# Patient Record
Sex: FEMALE | Race: BLACK OR AFRICAN AMERICAN | Employment: PART TIME | URBAN - METROPOLITAN AREA
[De-identification: names, ages, dates, MRNs, and addresses within clinical notes are randomized per-mention and may not be internally consistent; named-entity substitution may affect disease eponyms.]

---

## 2018-04-13 ENCOUNTER — OFFICE VISIT (OUTPATIENT)
Dept: ORTHOPEDIC SURGERY | Age: 43
End: 2018-04-13
Payer: COMMERCIAL

## 2018-04-13 VITALS
BODY MASS INDEX: 37.22 KG/M2 | HEART RATE: 78 BPM | DIASTOLIC BLOOD PRESSURE: 83 MMHG | HEIGHT: 64 IN | TEMPERATURE: 97 F | RESPIRATION RATE: 18 BRPM | SYSTOLIC BLOOD PRESSURE: 142 MMHG | WEIGHT: 218 LBS

## 2018-04-13 DIAGNOSIS — M75.81 TENDINITIS OF RIGHT ROTATOR CUFF: ICD-10-CM

## 2018-04-13 DIAGNOSIS — M75.82 TENDINITIS OF LEFT ROTATOR CUFF: Primary | ICD-10-CM

## 2018-04-13 PROCEDURE — 1036F TOBACCO NON-USER: CPT | Performed by: ORTHOPAEDIC SURGERY

## 2018-04-13 PROCEDURE — 99203 OFFICE O/P NEW LOW 30 MIN: CPT | Performed by: ORTHOPAEDIC SURGERY

## 2018-04-13 PROCEDURE — G8427 DOCREV CUR MEDS BY ELIG CLIN: HCPCS | Performed by: ORTHOPAEDIC SURGERY

## 2018-04-13 PROCEDURE — 99203 OFFICE O/P NEW LOW 30 MIN: CPT

## 2018-04-13 PROCEDURE — G8417 CALC BMI ABV UP PARAM F/U: HCPCS | Performed by: ORTHOPAEDIC SURGERY

## 2018-04-13 RX ORDER — MELOXICAM 7.5 MG/1
7.5 TABLET ORAL DAILY
Qty: 30 TABLET | Refills: 3 | Status: SHIPPED | OUTPATIENT
Start: 2018-04-13 | End: 2019-04-03

## 2019-04-03 PROBLEM — J45.40 MODERATE PERSISTENT ASTHMA WITHOUT COMPLICATION: Status: ACTIVE | Noted: 2019-04-03

## 2019-06-10 PROBLEM — J00 ACUTE RHINITIS: Status: ACTIVE | Noted: 2019-06-10

## 2020-03-11 PROBLEM — Z00.00 WELLNESS EXAMINATION: Status: ACTIVE | Noted: 2020-03-11

## 2020-03-27 PROBLEM — J45.21 MILD INTERMITTENT ASTHMA WITH EXACERBATION: Status: ACTIVE | Noted: 2020-03-27

## 2020-04-08 ENCOUNTER — HOSPITAL ENCOUNTER (OUTPATIENT)
Dept: GENERAL RADIOLOGY | Age: 45
Discharge: HOME | End: 2020-04-10
Payer: COMMERCIAL

## 2020-04-08 ENCOUNTER — HOSPITAL ENCOUNTER (OUTPATIENT)
Age: 45
Discharge: HOME | End: 2020-04-10
Payer: COMMERCIAL

## 2020-04-08 PROCEDURE — 71046 X-RAY EXAM CHEST 2 VIEWS: CPT

## 2020-04-10 PROBLEM — Z00.00 WELLNESS EXAMINATION: Status: RESOLVED | Noted: 2020-03-11 | Resolved: 2020-04-10

## 2020-11-02 ENCOUNTER — OFFICE VISIT (OUTPATIENT)
Dept: PRIMARY CARE CLINIC | Age: 45
End: 2020-11-02
Payer: COMMERCIAL

## 2020-11-02 PROCEDURE — 99204 OFFICE O/P NEW MOD 45 MIN: CPT | Performed by: FAMILY MEDICINE

## 2020-11-02 NOTE — PROGRESS NOTES
History   Problem Relation Age of Onset    Other Mother         uterine fibroids    Hypertension Father     High Cholesterol Father       Past Surgical History:   Procedure Laterality Date     SECTION      OTHER SURGICAL HISTORY      cervical conization    TUBAL LIGATION      UPPER GASTROINTESTINAL ENDOSCOPY      due to gastritis      Vitals:    20 1150   BP: (!) 145/85   Temp: 98.8 °F (37.1 °C)   Weight: 233 lb (105.7 kg)   Height: 5' 4\" (1.626 m)       Objective:    Physical Exam    Raymundo King was seen today for establish care. Diagnoses and all orders for this visit:    Essential hypertension    Moderate persistent asthma without complication    Acute rhinitis    Throat irritation    Gastroesophageal reflux disease without esophagitis        Comments: Have her check blood pressure few other places before I see her back in 2 weeks. Laboratory studies in a few days. We will do an EKG next visit. May need referral to allergist or ear nose and throat doctor. May need a GI evaluation. May need addition of Singulair or an antihistamine to her program.  Discussed diet and weight. Great toe time was spent reviewing records establishing treatment protocol treatment plan majority of the time spent on face-to-face exam and education. A great deal of time spent reviewing medications, diet, exercise, social issues. Also reviewing the chart before entering the room with patient and finishing charting after leaving patient's room. More than half of that time was spent face to face with the patient in counseling and coordinating care. Follow Up: Return in about 2 weeks (around 2020) for With EKG, Lab Before.      Seen by:  Vijaya Benites DO

## 2020-11-03 VITALS
WEIGHT: 233 LBS | DIASTOLIC BLOOD PRESSURE: 85 MMHG | TEMPERATURE: 98.8 F | HEIGHT: 64 IN | BODY MASS INDEX: 39.78 KG/M2 | SYSTOLIC BLOOD PRESSURE: 145 MMHG

## 2020-11-03 PROBLEM — J39.2 THROAT IRRITATION: Chronic | Status: ACTIVE | Noted: 2020-11-03

## 2020-11-03 PROBLEM — J45.40 MODERATE PERSISTENT ASTHMA WITHOUT COMPLICATION: Chronic | Status: ACTIVE | Noted: 2019-04-03

## 2020-11-03 PROBLEM — K21.9 GASTROESOPHAGEAL REFLUX DISEASE WITHOUT ESOPHAGITIS: Chronic | Status: ACTIVE | Noted: 2020-11-03

## 2020-11-03 PROBLEM — I10 ESSENTIAL HYPERTENSION: Status: ACTIVE | Noted: 2020-11-03

## 2020-11-03 PROBLEM — J00 ACUTE RHINITIS: Chronic | Status: ACTIVE | Noted: 2019-06-10

## 2020-11-03 PROBLEM — J39.2 THROAT IRRITATION: Status: ACTIVE | Noted: 2020-11-03

## 2020-11-03 PROBLEM — I10 ESSENTIAL HYPERTENSION: Chronic | Status: ACTIVE | Noted: 2020-11-03

## 2020-11-03 PROBLEM — K21.9 GASTROESOPHAGEAL REFLUX DISEASE WITHOUT ESOPHAGITIS: Status: ACTIVE | Noted: 2020-11-03

## 2020-11-03 ASSESSMENT — ENCOUNTER SYMPTOMS
ALLERGIC/IMMUNOLOGIC NEGATIVE: 1
EYES NEGATIVE: 1
GASTROINTESTINAL NEGATIVE: 1
RESPIRATORY NEGATIVE: 1

## 2020-11-03 ASSESSMENT — PATIENT HEALTH QUESTIONNAIRE - PHQ9
SUM OF ALL RESPONSES TO PHQ QUESTIONS 1-9: 0
SUM OF ALL RESPONSES TO PHQ9 QUESTIONS 1 & 2: 0
1. LITTLE INTEREST OR PLEASURE IN DOING THINGS: 0
2. FEELING DOWN, DEPRESSED OR HOPELESS: 0
SUM OF ALL RESPONSES TO PHQ QUESTIONS 1-9: 0
SUM OF ALL RESPONSES TO PHQ QUESTIONS 1-9: 0

## 2020-11-05 DIAGNOSIS — I10 ESSENTIAL HYPERTENSION: ICD-10-CM

## 2020-11-05 DIAGNOSIS — E03.9 ACQUIRED HYPOTHYROIDISM: ICD-10-CM

## 2020-11-05 DIAGNOSIS — E55.9 VITAMIN D DEFICIENCY: ICD-10-CM

## 2020-11-05 DIAGNOSIS — Z00.01 ENCOUNTER FOR ANNUAL GENERAL MEDICAL EXAMINATION WITH ABNORMAL FINDINGS IN ADULT: ICD-10-CM

## 2020-11-05 DIAGNOSIS — R73.03 PRE-DIABETES: ICD-10-CM

## 2020-11-05 LAB
ALBUMIN SERPL-MCNC: 4.3 G/DL (ref 3.5–5.2)
ALP BLD-CCNC: 85 U/L (ref 35–104)
ALT SERPL-CCNC: 15 U/L (ref 0–32)
ANION GAP SERPL CALCULATED.3IONS-SCNC: 12 MMOL/L (ref 7–16)
AST SERPL-CCNC: 33 U/L (ref 0–31)
BACTERIA: ABNORMAL /HPF
BASOPHILS ABSOLUTE: 0.05 E9/L (ref 0–0.2)
BASOPHILS RELATIVE PERCENT: 0.8 % (ref 0–2)
BILIRUB SERPL-MCNC: 0.6 MG/DL (ref 0–1.2)
BILIRUBIN URINE: NEGATIVE
BLOOD, URINE: NORMAL
BUN BLDV-MCNC: 9 MG/DL (ref 6–20)
CALCIUM SERPL-MCNC: 9.3 MG/DL (ref 8.6–10.2)
CHLORIDE BLD-SCNC: 104 MMOL/L (ref 98–107)
CHOLESTEROL, TOTAL: 179 MG/DL (ref 0–199)
CLARITY: CLEAR
CO2: 24 MMOL/L (ref 22–29)
COLOR: YELLOW
CREAT SERPL-MCNC: 0.6 MG/DL (ref 0.5–1)
EOSINOPHILS ABSOLUTE: 0.16 E9/L (ref 0.05–0.5)
EOSINOPHILS RELATIVE PERCENT: 2.4 % (ref 0–6)
EPITHELIAL CELLS, UA: ABNORMAL /HPF
GFR AFRICAN AMERICAN: >60
GFR NON-AFRICAN AMERICAN: >60 ML/MIN/1.73
GLUCOSE BLD-MCNC: 89 MG/DL (ref 74–99)
GLUCOSE URINE: NEGATIVE MG/DL
HBA1C MFR BLD: 5.1 % (ref 4–5.6)
HCT VFR BLD CALC: 40.2 % (ref 34–48)
HDLC SERPL-MCNC: 67 MG/DL
HEMOGLOBIN: 13.4 G/DL (ref 11.5–15.5)
IMMATURE GRANULOCYTES #: 0.01 E9/L
IMMATURE GRANULOCYTES %: 0.2 % (ref 0–5)
KETONES, URINE: NEGATIVE MG/DL
LDL CHOLESTEROL CALCULATED: 97 MG/DL (ref 0–99)
LEUKOCYTE ESTERASE, URINE: NEGATIVE
LYMPHOCYTES ABSOLUTE: 2.39 E9/L (ref 1.5–4)
LYMPHOCYTES RELATIVE PERCENT: 35.9 % (ref 20–42)
MCH RBC QN AUTO: 29.8 PG (ref 26–35)
MCHC RBC AUTO-ENTMCNC: 33.3 % (ref 32–34.5)
MCV RBC AUTO: 89.3 FL (ref 80–99.9)
MONOCYTES ABSOLUTE: 0.43 E9/L (ref 0.1–0.95)
MONOCYTES RELATIVE PERCENT: 6.5 % (ref 2–12)
NEUTROPHILS ABSOLUTE: 3.62 E9/L (ref 1.8–7.3)
NEUTROPHILS RELATIVE PERCENT: 54.2 % (ref 43–80)
NITRITE, URINE: NEGATIVE
PDW BLD-RTO: 11.8 FL (ref 11.5–15)
PH UA: 5.5 (ref 5–9)
PLATELET # BLD: 281 E9/L (ref 130–450)
PMV BLD AUTO: 10.6 FL (ref 7–12)
POTASSIUM SERPL-SCNC: 3.9 MMOL/L (ref 3.5–5)
PROTEIN UA: NEGATIVE MG/DL
RBC # BLD: 4.5 E12/L (ref 3.5–5.5)
RBC UA: ABNORMAL /HPF (ref 0–2)
SODIUM BLD-SCNC: 140 MMOL/L (ref 132–146)
SPECIFIC GRAVITY UA: 1.02 (ref 1–1.03)
T4 TOTAL: 7.3 MCG/DL (ref 4.5–11.7)
TOTAL PROTEIN: 7.5 G/DL (ref 6.4–8.3)
TRIGL SERPL-MCNC: 73 MG/DL (ref 0–149)
TSH SERPL DL<=0.05 MIU/L-ACNC: 0.64 UIU/ML (ref 0.27–4.2)
UROBILINOGEN, URINE: 0.2 E.U./DL
VITAMIN D 25-HYDROXY: 50 NG/ML (ref 30–100)
VLDLC SERPL CALC-MCNC: 15 MG/DL
WBC # BLD: 6.7 E9/L (ref 4.5–11.5)
WBC UA: ABNORMAL /HPF (ref 0–5)

## 2020-11-19 ENCOUNTER — OFFICE VISIT (OUTPATIENT)
Dept: PRIMARY CARE CLINIC | Age: 45
End: 2020-11-19
Payer: COMMERCIAL

## 2020-11-19 VITALS
BODY MASS INDEX: 40.46 KG/M2 | WEIGHT: 237 LBS | DIASTOLIC BLOOD PRESSURE: 82 MMHG | SYSTOLIC BLOOD PRESSURE: 132 MMHG | HEIGHT: 64 IN | TEMPERATURE: 98.3 F

## 2020-11-19 PROCEDURE — 93000 ELECTROCARDIOGRAM COMPLETE: CPT | Performed by: FAMILY MEDICINE

## 2020-11-19 PROCEDURE — 99396 PREV VISIT EST AGE 40-64: CPT | Performed by: FAMILY MEDICINE

## 2020-11-19 ASSESSMENT — ENCOUNTER SYMPTOMS
EYES NEGATIVE: 1
ALLERGIC/IMMUNOLOGIC NEGATIVE: 1
RESPIRATORY NEGATIVE: 1
GASTROINTESTINAL NEGATIVE: 1

## 2020-11-19 NOTE — PROGRESS NOTES
20  Name: Dayanara Bruce    : 1975    Sex: female    Age: 39 y.o. Subjective:  Chief Complaint: Patient is here for checkup regarding labs, asthma. Blood pressure    Feels well. No chest pain or shortness of breath. She has been to several doctors regarding mucus feeling in her throat. She does have some GERD symptoms. Review of Systems   Constitutional: Negative. HENT: Negative. Eyes: Negative. Respiratory: Negative. Cardiovascular: Negative. Gastrointestinal: Negative. Endocrine: Negative. Genitourinary: Negative. Musculoskeletal: Negative. Skin: Negative. Allergic/Immunologic: Negative. Neurological: Negative. Hematological: Negative. Psychiatric/Behavioral: Negative.           Current Outpatient Medications:     fluticasone-salmeterol (ADVAIR HFA) 115-21 MCG/ACT inhaler, Inhale 2 puffs into the lungs 2 times daily, Disp: 1 Inhaler, Rfl: 3    albuterol sulfate  (90 Base) MCG/ACT inhaler, Inhale 2 puffs into the lungs every 6 hours as needed for Wheezing, Disp: 1 Inhaler, Rfl: 5  No Known Allergies  Social History     Socioeconomic History    Marital status: Single     Spouse name: Not on file    Number of children: Not on file    Years of education: Not on file    Highest education level: Not on file   Occupational History    Not on file   Social Needs    Financial resource strain: Not on file    Food insecurity     Worry: Not on file     Inability: Not on file    Transportation needs     Medical: Not on file     Non-medical: Not on file   Tobacco Use    Smoking status: Former Smoker     Packs/day: 0.30     Years: 7.00     Pack years: 2.10     Types: Cigarettes     Start date:      Last attempt to quit:      Years since quittin.8    Smokeless tobacco: Never Used   Substance and Sexual Activity    Alcohol use: Yes     Comment: social    Drug use: No    Sexual activity: Not Currently     Partners: Male Comment: tubal ligation 2008   Lifestyle    Physical activity     Days per week: Not on file     Minutes per session: Not on file    Stress: Not on file   Relationships    Social connections     Talks on phone: Not on file     Gets together: Not on file     Attends Druze service: Not on file     Active member of club or organization: Not on file     Attends meetings of clubs or organizations: Not on file     Relationship status: Not on file    Intimate partner violence     Fear of current or ex partner: Not on file     Emotionally abused: Not on file     Physically abused: Not on file     Forced sexual activity: Not on file   Other Topics Concern    Not on file   Social History Narrative    New patient  with complaints of mucus in her throat    Single with 3 children. 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 Smoker quitting age 22    Seasonal allergies    Asthma    GERD    Elevated blood pressure      Referred to  ent  for throat irritation      Past Medical History:   Diagnosis Date    Abnormal Pap smear of cervix     Allergic rhinitis     seasonal    Asthma     Gastritis      Family History   Problem Relation Age of Onset    Other Mother         uterine fibroids    Hypertension Father     High Cholesterol Father       Past Surgical History:   Procedure Laterality Date     SECTION      OTHER SURGICAL HISTORY      cervical conization    TUBAL LIGATION      UPPER GASTROINTESTINAL ENDOSCOPY      due to gastritis      Vitals:    20 0949   BP: 132/82   Temp: 98.3 °F (36.8 °C)   Weight: 237 lb (107.5 kg)   Height: 5' 4\" (1.626 m)       Objective:    Physical Exam  Vitals signs reviewed. Constitutional:       Appearance: She is well-developed. HENT:      Head: Normocephalic. Eyes:      Pupils: Pupils are equal, round, and reactive to light. Neck:      Musculoskeletal: Normal range of motion. Cardiovascular:      Rate and Rhythm: Normal rate and regular rhythm. Pulmonary:      Effort: Pulmonary effort is normal.      Breath sounds: Normal breath sounds. Abdominal:      Palpations: Abdomen is soft. Musculoskeletal: Normal range of motion. Skin:     General: Skin is warm. Neurological:      Mental Status: She is alert and oriented to person, place, and time. Psychiatric:         Behavior: Behavior normal.         Paula Alan was seen today for discuss labs. Diagnoses and all orders for this visit:    Encounter for annual general medical examination with abnormal findings in adult    Essential hypertension  -     EKG 12 lead; Future  -     EKG 12 lead    Moderate persistent asthma without complication    Acute rhinitis    Throat irritation  -     Halima - Tanja Means Sic., DO, Ear, Nose, Throat, Bee    Gastroesophageal reflux disease without esophagitis        Comments: Low-fat low sugar diet. Exercise. Low-carb diet. Appointment ear nose and throat doctor. Warned of adverse effects of Advair. Check blood pressure periodically at home. A great deal of time spent reviewing medications, diet, exercise, social issues. Also reviewing the chart before entering the room with patient and finishing charting after leaving patient's room. More than half of that time was spent face to face with the patient in counseling and coordinating care.       Follow Up: Return for See Referral.     Seen by:  Feliciano Cranker, DO

## 2021-01-21 ENCOUNTER — OFFICE VISIT (OUTPATIENT)
Dept: ENT CLINIC | Age: 46
End: 2021-01-21
Payer: COMMERCIAL

## 2021-01-21 VITALS — BODY MASS INDEX: 40.46 KG/M2 | WEIGHT: 237 LBS | TEMPERATURE: 98 F | HEIGHT: 64 IN

## 2021-01-21 DIAGNOSIS — J34.2 DNS (DEVIATED NASAL SEPTUM): ICD-10-CM

## 2021-01-21 DIAGNOSIS — J30.9 ALLERGIC RHINITIS, UNSPECIFIED SEASONALITY, UNSPECIFIED TRIGGER: Primary | ICD-10-CM

## 2021-01-21 PROCEDURE — 99203 OFFICE O/P NEW LOW 30 MIN: CPT | Performed by: NURSE PRACTITIONER

## 2021-01-21 RX ORDER — FLUTICASONE PROPIONATE 50 MCG
2 SPRAY, SUSPENSION (ML) NASAL DAILY
Qty: 2 BOTTLE | Refills: 1 | Status: SHIPPED
Start: 2021-01-21 | End: 2021-06-14

## 2021-01-21 RX ORDER — AZELASTINE 1 MG/ML
2 SPRAY, METERED NASAL 2 TIMES DAILY
Qty: 1 BOTTLE | Refills: 1 | Status: SHIPPED
Start: 2021-01-21 | Stop reason: SDUPTHER

## 2021-01-21 ASSESSMENT — ENCOUNTER SYMPTOMS
SORE THROAT: 1
EYES NEGATIVE: 1
SINUS PAIN: 0
RESPIRATORY NEGATIVE: 1
SHORTNESS OF BREATH: 0
RHINORRHEA: 1
SINUS PRESSURE: 0
STRIDOR: 0

## 2021-01-21 NOTE — PROGRESS NOTES
[facility] Otolaryngology  Dr. Alber Cross. MARY Menchaca. Ms.Ed. New Consult       Patient Name:  Shelbie Alexander  :  1975     CHIEF C/O:    Chief Complaint   Patient presents with    Consultation     NP Sinus patient states sinus drainage, headaches. HISTORY OBTAINED FROM:  patient    HISTORY OF PRESENT ILLNESS:       Jameel Davison is a 39y.o. year old female, here today for sinus congestion, drainage and headaches. She states her symptoms have been persistent for the past 2 to 3 years and have worsened over the last 8 months. She states that she has near constant congestion, rhinorrhea, and postnasal drainage. She often has a sore throat that is worse in the morning. She does take Zyrtec daily for her allergies but no other medications for allergy symptoms. He denies any fevers. She states her drainage from her nose is clear without blood or purulence. She does complain of intermittent left ear pressure with mild muffling of her hearing. She denies any tinnitus or vertigo. She denies any previous sinus surgery. She does state having previous allergy testing with several environmental allergies. Past Medical History:   Diagnosis Date    Abnormal Pap smear of cervix     Allergic rhinitis     seasonal    Asthma     Gastritis      Past Surgical History:   Procedure Laterality Date     SECTION      OTHER SURGICAL HISTORY      cervical conization    TUBAL LIGATION      UPPER GASTROINTESTINAL ENDOSCOPY      due to gastritis       Current Outpatient Medications:     MULTIPLE VITAMIN PO, Take by mouth, Disp: , Rfl:     Probiotic Product (PROBIOTIC-10 PO), Take by mouth, Disp: , Rfl:     fluticasone-salmeterol (ADVAIR HFA) 115-21 MCG/ACT inhaler, Inhale 2 puffs into the lungs 2 times daily, Disp: 1 Inhaler, Rfl: 3    albuterol sulfate  (90 Base) MCG/ACT inhaler, Inhale 2 puffs into the lungs every 6 hours as needed for Wheezing, Disp: 1 Inhaler, Rfl: 5  Patient has no known allergies. Social History     Tobacco Use    Smoking status: Former Smoker     Packs/day: 0.30     Years: 7.00     Pack years: 2.10     Types: Cigarettes     Start date:      Quit date:      Years since quittin.0    Smokeless tobacco: Never Used   Substance Use Topics    Alcohol use: Yes     Comment: social    Drug use: No     Family History   Problem Relation Age of Onset    Other Mother         uterine fibroids    Hypertension Father     High Cholesterol Father        Review of Systems   Constitutional: Negative. Negative for activity change and appetite change. HENT: Positive for congestion, postnasal drip, rhinorrhea and sore throat. Negative for sinus pressure and sinus pain. Eyes: Negative. Respiratory: Negative. Negative for shortness of breath and stridor. Cardiovascular: Negative. Negative for chest pain and palpitations. Endocrine: Negative. Genitourinary: Negative. Musculoskeletal: Negative. Skin: Negative. Neurological: Negative. Negative for dizziness. Hematological: Negative. Psychiatric/Behavioral: Negative. Temp 98 °F (36.7 °C)   Ht 5' 4\" (1.626 m)   Wt 237 lb (107.5 kg)   BMI 40.68 kg/m²   Physical Exam  Constitutional:       Appearance: Normal appearance. HENT:      Head: Normocephalic. Right Ear: Tympanic membrane, ear canal and external ear normal.      Left Ear: Tympanic membrane, ear canal and external ear normal.      Nose: Septal deviation present. Right Turbinates: Swollen and pale. Left Turbinates: Swollen and pale. Mouth/Throat:      Lips: Pink. Eyes:      Conjunctiva/sclera: Conjunctivae normal.      Pupils: Pupils are equal, round, and reactive to light. Neck:      Musculoskeletal: Normal range of motion. No neck rigidity or muscular tenderness. Cardiovascular:      Rate and Rhythm: Normal rate and regular rhythm. Pulses: Normal pulses.    Pulmonary:

## 2021-02-04 ENCOUNTER — OFFICE VISIT (OUTPATIENT)
Dept: PRIMARY CARE CLINIC | Age: 46
End: 2021-02-04
Payer: COMMERCIAL

## 2021-02-04 VITALS — WEIGHT: 232 LBS | BODY MASS INDEX: 39.61 KG/M2 | TEMPERATURE: 98.8 F | HEIGHT: 64 IN

## 2021-02-04 DIAGNOSIS — M54.32 SCIATICA OF LEFT SIDE: Primary | ICD-10-CM

## 2021-02-04 PROCEDURE — 96372 THER/PROPH/DIAG INJ SC/IM: CPT | Performed by: FAMILY MEDICINE

## 2021-02-04 PROCEDURE — 99213 OFFICE O/P EST LOW 20 MIN: CPT | Performed by: FAMILY MEDICINE

## 2021-02-04 RX ORDER — PREDNISONE 10 MG/1
TABLET ORAL
Qty: 18 TABLET | Refills: 0 | Status: SHIPPED
Start: 2021-02-04 | End: 2021-03-05 | Stop reason: ALTCHOICE

## 2021-02-04 RX ORDER — BACLOFEN 10 MG/1
10 TABLET ORAL NIGHTLY
Qty: 14 TABLET | Refills: 1 | Status: SHIPPED
Start: 2021-02-04 | End: 2021-03-05

## 2021-02-04 RX ORDER — METHYLPREDNISOLONE ACETATE 40 MG/ML
40 INJECTION, SUSPENSION INTRA-ARTICULAR; INTRALESIONAL; INTRAMUSCULAR; SOFT TISSUE ONCE
Status: COMPLETED | OUTPATIENT
Start: 2021-02-04 | End: 2021-02-04

## 2021-02-04 RX ADMIN — METHYLPREDNISOLONE ACETATE 40 MG: 40 INJECTION, SUSPENSION INTRA-ARTICULAR; INTRALESIONAL; INTRAMUSCULAR; SOFT TISSUE at 11:02

## 2021-02-04 ASSESSMENT — ENCOUNTER SYMPTOMS
GASTROINTESTINAL NEGATIVE: 1
BACK PAIN: 1
RESPIRATORY NEGATIVE: 1

## 2021-02-04 NOTE — PROGRESS NOTES
21  Name: Karina Garcia    : 1975    Sex: female    Age: 39 y.o. Subjective:  Chief Complaint: Patient is here for left low back pain with rad into left leg to knee     One week  no trauma  bm and urine  Ok       No    Sim inpast      Review of Systems   Respiratory: Negative. Cardiovascular: Negative. Gastrointestinal: Negative. Musculoskeletal: Positive for back pain.         See  hpi         Current Outpatient Medications:     baclofen (LIORESAL) 10 MG tablet, Take 1 tablet by mouth nightly, Disp: 14 tablet, Rfl: 1    predniSONE (DELTASONE) 10 MG tablet, ONE TID FOR THREE DAYS, ONE BID FOR THREE DAYS, ONE QD FOR THREE DAYS   FOOD, Disp: 18 tablet, Rfl: 0    MULTIPLE VITAMIN PO, Take by mouth, Disp: , Rfl:     Probiotic Product (PROBIOTIC-10 PO), Take by mouth, Disp: , Rfl:     fluticasone (FLONASE) 50 MCG/ACT nasal spray, 2 sprays by Each Nostril route daily, Disp: 2 Bottle, Rfl: 1    azelastine (ASTELIN) 0.1 % nasal spray, 2 sprays by Nasal route 2 times daily Use in each nostril as directed, Disp: 1 Bottle, Rfl: 1    fluticasone-salmeterol (ADVAIR HFA) 115-21 MCG/ACT inhaler, Inhale 2 puffs into the lungs 2 times daily, Disp: 1 Inhaler, Rfl: 12    albuterol sulfate  (90 Base) MCG/ACT inhaler, Inhale 2 puffs into the lungs every 6 hours as needed for Wheezing, Disp: 1 Inhaler, Rfl: 5  No Known Allergies  Social History     Socioeconomic History    Marital status: Single     Spouse name: Not on file    Number of children: Not on file    Years of education: Not on file    Highest education level: Not on file   Occupational History    Not on file   Social Needs    Financial resource strain: Not on file    Food insecurity     Worry: Not on file     Inability: Not on file    Transportation needs     Medical: Not on file     Non-medical: Not on file   Tobacco Use    Smoking status: Former Smoker     Packs/day: 0.30     Years: 7.00     Pack years: 2.10 Height: 5' 4\" (1.626 m)       Objective:    Physical Exam  Neck:      Musculoskeletal: Normal range of motion and neck supple. Cardiovascular:      Rate and Rhythm: Normal rate and regular rhythm. Pulses: Normal pulses. Heart sounds: Normal heart sounds. Musculoskeletal:      Comments: Right si tender with palp with  Para lbumr spasm  fuoll rom left hip      Dec flex  lubamr spine  5-%         Jaleel Every was seen today for other. Diagnoses and all orders for this visit:    Sciatica of left side  -     methylPREDNISolone acetate (DEPO-MEDROL) injection 40 mg  -     baclofen (LIORESAL) 10 MG tablet; Take 1 tablet by mouth nightly  -     predniSONE (DELTASONE) 10 MG tablet; ONE TID FOR THREE DAYS, ONE BID FOR THREE DAYS, ONE QD FOR THREE DAYS   FOOD        Comments: meds  Not  Great by Monday call for   X ray  And  Poss referral A great deal of time spent reviewing medications, diet, exercise, social issues. Also reviewing the chart before entering the room with patient and finishing charting after leaving patient's room. More than half of that time was spent face to face with the patient in counseling and coordinating care. Follow Up: Return if symptoms worsen or fail to improve.      Seen by:  Tad Meigs, DO

## 2021-02-08 ENCOUNTER — TELEPHONE (OUTPATIENT)
Dept: PRIMARY CARE CLINIC | Age: 46
End: 2021-02-08

## 2021-02-08 ENCOUNTER — TELEPHONE (OUTPATIENT)
Dept: ADMINISTRATIVE | Age: 46
End: 2021-02-08

## 2021-02-08 DIAGNOSIS — M54.32 SCIATICA OF LEFT SIDE: Primary | ICD-10-CM

## 2021-02-08 DIAGNOSIS — M51.16 INTERVERTEBRAL DISC DISORDERS WITH RADICULOPATHY, LUMBAR REGION: ICD-10-CM

## 2021-02-08 NOTE — TELEPHONE ENCOUNTER
Pt calling stating she was to update you on her left leg pain and if she was no better you would order an xray. She states pain is no better and goes from her hip down to her lower leg.

## 2021-02-09 ENCOUNTER — HOSPITAL ENCOUNTER (OUTPATIENT)
Age: 46
Discharge: HOME | End: 2021-02-11
Payer: COMMERCIAL

## 2021-02-09 ENCOUNTER — HOSPITAL ENCOUNTER (OUTPATIENT)
Dept: GENERAL RADIOLOGY | Age: 46
Discharge: HOME | End: 2021-02-11
Payer: COMMERCIAL

## 2021-02-09 DIAGNOSIS — M54.32 SCIATICA OF LEFT SIDE: ICD-10-CM

## 2021-02-09 DIAGNOSIS — M51.16 INTERVERTEBRAL DISC DISORDERS WITH RADICULOPATHY, LUMBAR REGION: ICD-10-CM

## 2021-02-09 PROCEDURE — 72170 X-RAY EXAM OF PELVIS: CPT

## 2021-02-09 PROCEDURE — 73502 X-RAY EXAM HIP UNI 2-3 VIEWS: CPT

## 2021-02-09 PROCEDURE — 72100 X-RAY EXAM L-S SPINE 2/3 VWS: CPT

## 2021-02-10 ENCOUNTER — OFFICE VISIT (OUTPATIENT)
Dept: PRIMARY CARE CLINIC | Age: 46
End: 2021-02-10
Payer: COMMERCIAL

## 2021-02-10 ENCOUNTER — TELEPHONE (OUTPATIENT)
Dept: PRIMARY CARE CLINIC | Age: 46
End: 2021-02-10

## 2021-02-10 VITALS — BODY MASS INDEX: 39.61 KG/M2 | WEIGHT: 232 LBS | TEMPERATURE: 98.7 F | HEIGHT: 64 IN

## 2021-02-10 DIAGNOSIS — M54.42 ACUTE LEFT-SIDED LOW BACK PAIN WITH LEFT-SIDED SCIATICA: Primary | ICD-10-CM

## 2021-02-10 DIAGNOSIS — M51.9 INTERVERTEBRAL LUMBAR DISC DISORDER: ICD-10-CM

## 2021-02-10 PROCEDURE — 99213 OFFICE O/P EST LOW 20 MIN: CPT | Performed by: FAMILY MEDICINE

## 2021-02-10 RX ORDER — ETODOLAC 400 MG/1
400 TABLET, FILM COATED ORAL 2 TIMES DAILY
Qty: 20 TABLET | Refills: 0 | Status: SHIPPED
Start: 2021-02-10 | End: 2021-03-05

## 2021-02-10 ASSESSMENT — ENCOUNTER SYMPTOMS
ALLERGIC/IMMUNOLOGIC NEGATIVE: 1
BACK PAIN: 1
RESPIRATORY NEGATIVE: 1
EYES NEGATIVE: 1
GASTROINTESTINAL NEGATIVE: 1

## 2021-02-10 ASSESSMENT — PATIENT HEALTH QUESTIONNAIRE - PHQ9
1. LITTLE INTEREST OR PLEASURE IN DOING THINGS: 0
SUM OF ALL RESPONSES TO PHQ QUESTIONS 1-9: 0

## 2021-02-10 NOTE — PROGRESS NOTES
2/10/21  Name: Janina Barrios    : 1975    Sex: female    Age: 39 y.o. Subjective:  Chief Complaint: Patient is here for re  Ck   Back pain     Left lwo back with rad to left lag  X ray noted  Worse with sitting  Left buttock  To  Entire left leg      Review of Systems   Constitutional: Negative. HENT: Negative. Eyes: Negative. Respiratory: Negative. Cardiovascular: Negative. Gastrointestinal: Negative. Endocrine: Negative. Genitourinary: Negative. Musculoskeletal: Positive for back pain. See  hpi   Skin: Negative. Allergic/Immunologic: Negative. Neurological: Negative. Hematological: Negative. Psychiatric/Behavioral: Negative.           Current Outpatient Medications:     etodolac (LODINE) 400 MG tablet, Take 1 tablet by mouth 2 times daily food, Disp: 20 tablet, Rfl: 0    baclofen (LIORESAL) 10 MG tablet, Take 1 tablet by mouth nightly, Disp: 14 tablet, Rfl: 1    predniSONE (DELTASONE) 10 MG tablet, ONE TID FOR THREE DAYS, ONE BID FOR THREE DAYS, ONE QD FOR THREE DAYS   FOOD, Disp: 18 tablet, Rfl: 0    MULTIPLE VITAMIN PO, Take by mouth, Disp: , Rfl:     Probiotic Product (PROBIOTIC-10 PO), Take by mouth, Disp: , Rfl:     fluticasone (FLONASE) 50 MCG/ACT nasal spray, 2 sprays by Each Nostril route daily, Disp: 2 Bottle, Rfl: 1    azelastine (ASTELIN) 0.1 % nasal spray, 2 sprays by Nasal route 2 times daily Use in each nostril as directed, Disp: 1 Bottle, Rfl: 1    fluticasone-salmeterol (ADVAIR HFA) 115-21 MCG/ACT inhaler, Inhale 2 puffs into the lungs 2 times daily, Disp: 1 Inhaler, Rfl: 12    albuterol sulfate  (90 Base) MCG/ACT inhaler, Inhale 2 puffs into the lungs every 6 hours as needed for Wheezing, Disp: 1 Inhaler, Rfl: 5  No Known Allergies  Social History     Socioeconomic History    Marital status: Single     Spouse name: Not on file    Number of children: Not on file    Years of education: Not on file  Highest education level: Not on file   Occupational History    Not on file   Social Needs    Financial resource strain: Not on file    Food insecurity     Worry: Not on file     Inability: Not on file    Transportation needs     Medical: Not on file     Non-medical: Not on file   Tobacco Use    Smoking status: Former Smoker     Packs/day: 0.30     Years: 7.00     Pack years: 2.10     Types: Cigarettes     Start date:      Quit date:      Years since quittin.1    Smokeless tobacco: Never Used   Substance and Sexual Activity    Alcohol use: Yes     Comment: social    Drug use: No    Sexual activity: Not Currently     Partners: Male     Comment: tubal ligation    Lifestyle    Physical activity     Days per week: Not on file     Minutes per session: Not on file    Stress: Not on file   Relationships    Social connections     Talks on phone: Not on file     Gets together: Not on file     Attends Latter day service: Not on file     Active member of club or organization: Not on file     Attends meetings of clubs or organizations: Not on file     Relationship status: Not on file    Intimate partner violence     Fear of current or ex partner: Not on file     Emotionally abused: Not on file     Physically abused: Not on file     Forced sexual activity: Not on file   Other Topics Concern    Not on file   Social History Narrative    New patient  with complaints of mucus in her throat    Single with 3 children. 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   Smoker quitting age 22    Seasonal allergies    Asthma    GERD    Elevated blood pressure      Referred to  ent  for throat irritation    Low back pain      referred to [facility]      Past Medical History:   Diagnosis Date    Abnormal Pap smear of cervix     Allergic rhinitis     seasonal    Asthma     Gastritis      Family History Comments: appt dr Strauss Late  asap   Sx cong avendano A great deal of time spent reviewing medications, diet, exercise, social issues. Also reviewing the chart before entering the room with patient and finishing charting after leaving patient's room. More than half of that time was spent face to face with the patient in counseling and coordinating care.       Follow Up: Return for See Referral.     Seen by:  Isaura Russo DO

## 2021-02-11 ENCOUNTER — OFFICE VISIT (OUTPATIENT)
Dept: PAIN MANAGEMENT | Age: 46
End: 2021-02-11
Payer: COMMERCIAL

## 2021-02-11 VITALS
BODY MASS INDEX: 39.61 KG/M2 | RESPIRATION RATE: 16 BRPM | OXYGEN SATURATION: 98 % | SYSTOLIC BLOOD PRESSURE: 120 MMHG | TEMPERATURE: 97.1 F | WEIGHT: 232 LBS | HEART RATE: 73 BPM | DIASTOLIC BLOOD PRESSURE: 78 MMHG | HEIGHT: 64 IN

## 2021-02-11 DIAGNOSIS — M51.9 LUMBAR DISC DISORDER: Primary | ICD-10-CM

## 2021-02-11 DIAGNOSIS — M47.816 LUMBAR SPONDYLOSIS: ICD-10-CM

## 2021-02-11 DIAGNOSIS — J45.20 MILD INTERMITTENT ASTHMA WITHOUT COMPLICATION: ICD-10-CM

## 2021-02-11 DIAGNOSIS — M62.838 MUSCLE SPASM: ICD-10-CM

## 2021-02-11 DIAGNOSIS — G89.4 CHRONIC PAIN SYNDROME: ICD-10-CM

## 2021-02-11 DIAGNOSIS — M54.16 LUMBAR RADICULOPATHY: ICD-10-CM

## 2021-02-11 DIAGNOSIS — M79.662 PAIN IN LEFT LOWER LEG: ICD-10-CM

## 2021-02-11 PROCEDURE — 99204 OFFICE O/P NEW MOD 45 MIN: CPT | Performed by: PAIN MEDICINE

## 2021-02-11 RX ORDER — TIZANIDINE 4 MG/1
4 TABLET ORAL DAILY PRN
Qty: 30 TABLET | Refills: 0 | Status: SHIPPED | OUTPATIENT
Start: 2021-02-11 | End: 2021-03-13

## 2021-02-11 RX ORDER — CICLOPIROX 1 G/100ML
SHAMPOO TOPICAL
COMMUNITY
Start: 2021-01-21

## 2021-02-11 NOTE — PROGRESS NOTES
Do you currently have any of the following:    Fever: No  Headache:  No  Cough: No  Shortness of breath: No  Exposed to anyone with these symptoms: No                                                                                                                Su Sutton presents to the [facility] on 2/11/2021. Thomas Aquino is complaining of pain in lower back, radiates down left leg to mid calf. Started 2 weeks ago. . The pain is constant. The pain is described as aching, throbbing, shooting, stabbing and sharp. Pain is rated on her best day at a 3, on her worst day at a 10, and on average at a 6 on the VAS scale. She took her last dose of aleve, tylenol, lodine and baclofen . Thomas Aquino does not have issues with constipation. Any procedures since your last visit: No,     She is  on NSAIDS and  is not on anticoagulation medications to include none and is managed by NA. Pacemaker or defibrillator: No Physician managing device is NA. Medication Contract and Consent for Opioid Use Documents Filed      No documents found                   /78   Pulse 73   Temp 97.1 °F (36.2 °C) (Infrared)   Resp 16   Ht 5' 4\" (1.626 m)   Wt 232 lb (105.2 kg)   LMP 01/20/2021   SpO2 98%   BMI 39.82 kg/m²      Patient's last menstrual period was 01/20/2021.

## 2021-02-11 NOTE — PROGRESS NOTES
predniSONE (DELTASONE) 10 MG tablet ONE TID FOR THREE DAYS, ONE BID FOR THREE DAYS, ONE QD FOR THREE DAYS   FOOD 21  Yes Isauro Villafana, DO   MULTIPLE VITAMIN PO Take by mouth   Yes Historical Provider, MD   Probiotic Product (PROBIOTIC-10 PO) Take by mouth   Yes Historical Provider, MD   fluticasone (FLONASE) 50 MCG/ACT nasal spray 2 sprays by Each Nostril route daily 21  Yes HEATHER Mercado CNP   azelastine (ASTELIN) 0.1 % nasal spray 2 sprays by Nasal route 2 times daily Use in each nostril as directed 21  Yes HEATHER Mercado CNP   fluticasone-salmeterol (ADVAIR HFA) 115-21 MCG/ACT inhaler Inhale 2 puffs into the lungs 2 times daily 21  Yes Isauro Villafana, DO   albuterol sulfate  (90 Base) MCG/ACT inhaler Inhale 2 puffs into the lungs every 6 hours as needed for Wheezing 6/10/19  Yes Yoli Werner, DO   Ciclopirox 1 % SHAM WASH EVERY OTHER DAY AND LET SIT FOR 5 MINUTES PRIOR TO RINSING 21   Historical Provider, MD     No Known Allergies    Social History     Socioeconomic History    Marital status: Single     Spouse name: Not on file    Number of children: Not on file    Years of education: Not on file    Highest education level: Not on file   Occupational History    Not on file   Social Needs    Financial resource strain: Not on file    Food insecurity     Worry: Not on file     Inability: Not on file    Transportation needs     Medical: Not on file     Non-medical: Not on file   Tobacco Use    Smoking status: Former Smoker     Packs/day: 0.30     Years: 7.00     Pack years: 2.10     Types: Cigarettes     Start date:      Quit date:      Years since quittin.1    Smokeless tobacco: Never Used   Substance and Sexual Activity    Alcohol use: Yes     Comment: social    Drug use: No    Sexual activity: Not Currently     Partners: Male     Comment: tubal ligation    Lifestyle    Physical activity     Days per week: Not on file Minutes per session: Not on file    Stress: Not on file   Relationships    Social connections     Talks on phone: Not on file     Gets together: Not on file     Attends Voodoo service: Not on file     Active member of club or organization: Not on file     Attends meetings of clubs or organizations: Not on file     Relationship status: Not on file    Intimate partner violence     Fear of current or ex partner: Not on file     Emotionally abused: Not on file     Physically abused: Not on file     Forced sexual activity: Not on file   Other Topics Concern    Not on file   Social History Narrative    New patient 11-20 with complaints of mucus in her throat    Single with 3 children. 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 Smoker quitting age 22    Seasonal allergies    Asthma    GERD    Elevated blood pressure  11-20    Referred to  ent 11-20 for throat irritation    Low back pain   2-21   referred to [facility]     Family History   Problem Relation Age of Onset    Other Mother         uterine fibroids    Hypertension Father     High Cholesterol Father      REVIEW OF SYSTEMS:     Patient specifically denies fever/chills, chest pain, shortness of breath, new bowel or bladder complaints. All other review of systems was negative. PHYSICAL EXAMINATION:      /78   Pulse 73   Temp 97.1 °F (36.2 °C) (Infrared)   Resp 16   Ht 5' 4\" (1.626 m)   Wt 232 lb (105.2 kg)   LMP 01/20/2021   SpO2 98%   BMI 39.82 kg/m²     General:      General appearance:   pleasant and well-hydrated.    , in moderate discomfort and A & O x3    HEENT:    Head:normocephalic and atraumatic  Sclera: icterus absent,     Lungs:    Breathing:Breathing Pattern: regular, no distress    Abdomen:    Shape:non-distended and normal  Tenderness:none    Lumbar spine:    Spine inspection:normal   CVA tenderness:No Palpation:tenderness paravertebral muscles, facet loading, left, right and negative. Range of motion:abnormal mildly Lateral bending, flexion, extension rotation bilateral and is  painful. Musculoskeletal:    Trigger points in Paraveteral:absent bilaterally  SI joint tenderness:negative right, negative left              MARYJO test:negative right, negative             left  Piriformis tenderness:negative right, negative left  Trochanteric bursa tenderness:negative right, positive left  SLR:negative right, positive left, sitting     Extremities:    Tremors:None bilaterally upper and lower  Range of motion:pain with internal rotation of hips negative. Intact:Yes  Edema:Normal    Neurological:    Sensory:normal to light touch bilateral lower extremities. Motor:                       Right Quadriceps5/5          Left Quadriceps5/5           Right Gastrocnemius5/5    Left Gastrocnemius5/5  Right Ant Tibialis5/5  Left Ant Tibialis5/5  Reflexes:    Right Quadriceps reflex2+  Left Quadriceps reflex2+  Right Achilles reflex2+  Left Achilles reflex2+  Gait:antalgic    Dermatology:    Skin:no unusual rashes and no skin lesions    Impression:  Low back pain with radiation to the Left lower extremity  Plan:  Lumbar radiculopathy. Will refer patient to physical therapy. Will consider lumbar spine MRI if less than expected response with physical therapy. Currently on Lodine 400 mg BID  Discontinue Baclofen 10 mg QD, will start patient on Zanaflex 4 mg QHS. Cancel urine screen. OARRS report reviewed 02/2021. Patient encouraged to stay active and to lose weight. Treatment plan discussed with the patient including medications side effects.

## 2021-02-15 ENCOUNTER — HOSPITAL ENCOUNTER (OUTPATIENT)
Dept: PHYSICAL THERAPY | Age: 46
Setting detail: THERAPIES SERIES
Discharge: HOME | End: 2021-02-15
Payer: COMMERCIAL

## 2021-02-15 PROCEDURE — 97161 PT EVAL LOW COMPLEX 20 MIN: CPT | Performed by: PHYSICAL THERAPIST

## 2021-02-15 ASSESSMENT — PAIN DESCRIPTION - DESCRIPTORS: DESCRIPTORS: ACHING;BURNING;CONSTANT

## 2021-02-15 ASSESSMENT — PAIN DESCRIPTION - PAIN TYPE: TYPE: CHRONIC PAIN

## 2021-02-15 NOTE — COMMUNICATION BODY
Assessment:  Conditions Requiring Skilled Therapeutic Intervention  Body structures, Functions, Activity limitations: Decreased ROM; Decreased endurance  Assessment: pain noted across L side LB/L LE to knee with all prolonged activities/postures, 7/10  Prognosis: Fair;Good  Decision Making: Low Complexity  Activity Tolerance  Activity Tolerance: Patient Tolerated treatment well      Plan:  Plan  Times per week: pt to be seen 2x/week/3-4 weeks  Current Treatment Recommendations: ROM, Strengthening, Endurance Training, Modalities, Home Exercise Program    Goals  Time Frame for goals: 3-4 weeks  goal 1: decrease pain across L side LB/LE with all prolonged activities, 0-4/10  goal 2: restore LB AROM to [facility] for all ranges  goal 3: improve endurance for all prolonged activities to GOOD/GOOD+  goal 4: assure I with HEP for home management of condition    Patient goals : to get rid of the pain across her back/LE with all prolonged activities/postures

## 2021-02-15 NOTE — PROGRESS NOTES
[address] [facility]                Phone: [phone]   Fax: [phone]    Physical Therapy Daily Treatment Note  Date:  2/15/2021    Patient Name:  Zully Luna    :  1975  MRN: [id number]    Evaluating therapist:  PEPITO Morrow                 (2/15/21)  Restrictions/Precautions:    Diagnosis  chronic LBP   Treatment Diagnosis:    Insurance/Certification information:  Mary Jane Ny  Referring Physician:  Dyana Berkowitz. Plan of care signed (Y/N):    Visit# / total visits:    Pain level: 7/10   Time In:  Time Out:    Subjective:      Exercises:  Exercise/Equipment Resistance/Repetitions Other comments   StepOne              tball flex/rot            rot st     SKTC     DKTC     piriformis st     hamst st              pelvic tilts                  bridges                                                         Other Therapeutic Activities:      Home Exercise Program:  provided 2/15/21    Manual Treatments:      Modalities: IFC/MH to LB PRN      Timed Code Treatment Minutes: Total Treatment Minutes:      Treatment/Activity Tolerance:  [] Patient tolerated treatment well [] Patient limited by fatique  [] Patient limited by pain  [] Patient limited by other medical complications  [] Other:     Prognosis: [] Good [] Fair  [] Poor    Patient Requires Follow-up: [] Yes  [] No    Plan:   [] Continue per plan of care [] Alter current plan (see comments)  [] Plan of care initiated [] Hold pending MD visit [] Discharge  Plan for Next Session:      See Weekly Progress Note: []  Yes  []  No  Next due:        Electronically signed by:   Ladonna Mercado
balance is GOOD/GOOD+     Assessment   Conditions Requiring Skilled Therapeutic Intervention  Body structures, Functions, Activity limitations: Decreased ROM; Decreased endurance  Assessment: pain noted across L side LB/L LE to knee with all prolonged activities/postures, 7/10  Prognosis: Fair;Good  Decision Making: Low Complexity  Activity Tolerance  Activity Tolerance: Patient Tolerated treatment well       Plan   Plan  Times per week: pt to be seen 2x/week/3-4 weeks  Current Treatment Recommendations: ROM, Strengthening, Endurance Training, Modalities, Home Exercise Program    Goals  Time Frame for goals: 3-4 weeks  goal 1: decrease pain across L side LB/LE with all prolonged activities, 0-4/10  goal 2: restore LB AROM to [facility] for all ranges  goal 3: improve endurance for all prolonged activities to GOOD/GOOD+  goal 4: assure I with HEP for home management of condition    Patient goals : to get rid of the pain across her back/LE with all prolonged activities/postures        Janelle Cruz, PT

## 2021-02-17 ENCOUNTER — HOSPITAL ENCOUNTER (OUTPATIENT)
Dept: PHYSICAL THERAPY | Age: 46
Setting detail: THERAPIES SERIES
Discharge: HOME | End: 2021-02-17
Payer: COMMERCIAL

## 2021-02-17 PROCEDURE — 97110 THERAPEUTIC EXERCISES: CPT

## 2021-02-17 PROCEDURE — G0283 ELEC STIM OTHER THAN WOUND: HCPCS

## 2021-02-22 ENCOUNTER — HOSPITAL ENCOUNTER (OUTPATIENT)
Dept: PHYSICAL THERAPY | Age: 46
Setting detail: THERAPIES SERIES
Discharge: HOME | End: 2021-02-22
Payer: COMMERCIAL

## 2021-02-22 PROCEDURE — 97110 THERAPEUTIC EXERCISES: CPT

## 2021-02-22 NOTE — PROGRESS NOTES
[address] [facility]                Phone: [phone]   Fax: [phone]    Physical Therapy Daily Treatment Note  Date:  2021    Patient Name:  Zully Luna    :  1975  MRN: [id number]    Evaluating therapist:  PEPITO Morrow                 (2/15/21)  Restrictions/Precautions:    Diagnosis  chronic LBP   Treatment Diagnosis:    Insurance/Certification information:  Mary Jane Ny  Referring Physician:  Dyana Berkowitz. Plan of care signed (Y/N):    Visit# / total visits:  3/6  Pain level: 1/10   Time In: 852  Time Out: 928    Subjective:  Pt reported a reduction in her LBP. But she did report that she continues to have pain when she  transitions from sit <> stand , that is 3-10 ( which is less than previously reported )  , with less frequent radicular symptoms into L thigh. Pt also reports she is relying less on over the counter pain meds ( re: tylenol, and advil ) . Pt compliant w/ HEP    Exercises:  Exercise/Equipment Resistance/Repetitions Other comments   StepOne   10 mins           tball flex/rot 20 sec x 3 reps            rot st 15 sec x 5 reps     SKTC 20 sec x 3 reps B     DKTC 20 sec x 3 reps     piriformis st 20 sec x 3 reps B     hamst st  W/ OFB 20 sec x 3 reps B              pelvic tilts    5 sec x 10 reps               bridges  3 sec x 10 reps                                                        Other   Pt performed exercises w/ good pacing and effort. Pt reports compliance w/ HEP . Pt reported she gets better pain relief w/ ice and stretching, and declined IFC w/ MH.  Pt to apply ice at home after her session       Home Exercise Program:  provided 2/15/21    Manual Treatments:  NA    Modalities:    Timed Code Treatment Minutes:  36    Total Treatment Minutes: 36    Treatment/Activity Tolerance:  [x] Patient tolerated treatment well [] Patient limited by fatique  [] Patient limited by pain  [] Patient limited by other medical complications  [] Other:

## 2021-02-24 ENCOUNTER — HOSPITAL ENCOUNTER (OUTPATIENT)
Dept: PHYSICAL THERAPY | Age: 46
Setting detail: THERAPIES SERIES
Discharge: HOME | End: 2021-02-24
Payer: COMMERCIAL

## 2021-02-24 PROCEDURE — 97110 THERAPEUTIC EXERCISES: CPT

## 2021-02-24 NOTE — PROGRESS NOTES
[address] [facility]                Phone: [phone]   Fax: [phone]    Physical Therapy Daily Treatment Note  Date:  2021    Patient Name:  Obi Mckeon    :  1975  MRN: [id number]    Evaluating therapist:  PEPITO Springer                 (2/15/21)  Restrictions/Precautions:    Diagnosis  chronic LBP   Treatment Diagnosis:    Insurance/Certification information:  [facility]  Referring Physician:  Manuel Alberto. Plan of care signed (Y/N):    Visit# / total visits:    Pain level: 10   Time In: 905  Time Out: 941    Subjective:  Pt had nothing new to report. Pt compliant w/ HEP    Exercises:  Exercise/Equipment Resistance/Repetitions Other comments   StepOne   10 mins           tball flex/rot 20 sec x 3 reps            rot st 15 sec x 5 reps     SKTC 20 sec x 3 reps B     DKTC 20 sec x 3 reps     piriformis st 20 sec x 3 reps B     hamst st  W/ OFB 20 sec x 3 reps B              pelvic tilts    5 sec x 10 reps               bridges  3 sec x 10 reps                                                        Other   Pt performed exercises w/ fair pacing and effort. Pt reports compliance w/ HEP . Pt reported she gets better pain relief w/ ice and stretching, and declined IFC w/ MH.  Pt to apply ice at home after her session       Home Exercise Program:  provided 2/15/21    Manual Treatments:  NA    Modalities:    Timed Code Treatment Minutes:  36    Total Treatment Minutes: 36    Treatment/Activity Tolerance:  [x] Patient tolerated treatment well [] Patient limited by fatique  [] Patient limited by pain  [] Patient limited by other medical complications  [] Other:     Prognosis: [x] Good [] Fair  [] Poor    Patient Requires Follow-up: [x] Yes  [] No    Plan:   [x] Continue per plan of care [] Alter current plan (see comments)  [] Plan of care initiated [] Hold pending MD visit [] Discharge  Plan for Next Session:      See Weekly Progress Note: []  Yes  [x]  No  Next due:        Electronically signed by:  Vivian Anthony PTA [id number]

## 2021-03-01 ENCOUNTER — HOSPITAL ENCOUNTER (OUTPATIENT)
Dept: PHYSICAL THERAPY | Age: 46
Setting detail: THERAPIES SERIES
Discharge: HOME | End: 2021-03-01
Payer: COMMERCIAL

## 2021-03-01 PROCEDURE — 97110 THERAPEUTIC EXERCISES: CPT

## 2021-03-01 NOTE — PROGRESS NOTES
[address] [facility]                Phone: [phone]   Fax: [phone]    Physical Therapy Daily Treatment Note  Date:  3/1/2021    Patient Name:  Uzma Ma    :  1975  MRN: [id number]    Evaluating therapist:  PEPITO Hernandez                 (2/15/21)  Restrictions/Precautions:    Diagnosis  chronic LBP   Treatment Diagnosis:    Insurance/Certification information:  Alie Milner  Referring Physician:  Bala Ulrich. Plan of care signed (Y/N):    Visit# / total visits:    Pain level: 3-10   Time In: 900  Time Out: 935    Subjective:  Pt continues to report LBP is consistent w/exacerbations of pain when performing sit <> stand transitions, 7/10 w/ radicular symptoms in L LE distal to mid calf. Pt compliant w/ HEP    Exercises:  Exercise/Equipment Resistance/Repetitions Other comments   StepOne   10 mins           tball flex/rot 20 sec x 3 reps            rot st 15 sec x 5 reps     SKTC 20 sec x 3 reps B     DKTC 20 sec x 3 reps     piriformis st 20 sec x 3 reps B     hamst st  W/ OFB 20 sec x 3 reps B              pelvic tilts    5 sec x 10 reps               bridges  3 sec x 10 reps                                                        Other   Pt performed exercises w/ fair pacing and effort. Pt requires a standing pause when coming to stand d/t brief exacerbation of pain w/ radicular symptoms down L LE, before she can begin to amb. Pt reports compliance w/ HEP . Pt reported she gets better pain relief w/ ice and stretching, and declined IFC w/ MH.  Pt reported she will  apply ice at home after her session along w/ taking Ibuprofen        Home Exercise Program:  provided 2/15/21    Manual Treatments:  NA    Modalities:    Timed Code Treatment Minutes:  35    Total Treatment Minutes: 35    Treatment/Activity Tolerance:  [x] Patient tolerated treatment well [] Patient limited by fatique  [] Patient limited by pain  [] Patient limited by other medical complications

## 2021-03-03 ENCOUNTER — HOSPITAL ENCOUNTER (OUTPATIENT)
Dept: PHYSICAL THERAPY | Age: 46
Setting detail: THERAPIES SERIES
Discharge: HOME | End: 2021-03-03
Payer: COMMERCIAL

## 2021-03-03 PROCEDURE — 97110 THERAPEUTIC EXERCISES: CPT

## 2021-03-03 NOTE — PROGRESS NOTES
[address] [facility]                Phone: [phone]   Fax: [phone]    Physical Therapy Daily Treatment Note  Date:  3/3/2021    Patient Name:  Sabiha Mancia    :  1975  MRN: [id number]    Evaluating therapist:  PEPITO Quintana                 (2/15/21)  Restrictions/Precautions:    Diagnosis  chronic LBP   Treatment Diagnosis:    Insurance/Certification information:  Sumi Hermosillo  Referring Physician:  Chantelle Vaughn Plan of care signed (Y/N):    Visit# / total visits:    Pain level: 3-4/10   Time In: 900  Time Out: 938    Subjective:  Pt continues to report LBP is consistent w/exacerbations of pain when performing sit <> stand transitions, 7/10 w/ radicular symptoms in L LE distal to mid calf. Pt compliant w/ HEP    Exercises:  Exercise/Equipment Resistance/Repetitions Other comments   StepOne   10 mins           tball flex/rot 20 sec x 3 reps            rot st 15 sec x 5 reps  HEP   SKTC 20 sec x 3 reps B  HEP   DKTC 20 sec x 3 reps  HEP   piriformis st 20 sec x 3 reps B  HEP   hamst st  W/ OFB 20 sec x 3 reps B  HEP            pelvic tilts    5 sec x 10 reps  HEP             bridges  3 sec x 10 reps  HEP                                                      Other   Pt performed exercises w/ fair pacing and effort. Pt requires a standing pause when coming to stand d/t brief exacerbation of pain w/ radicular symptoms down L LE, before she can begin to amb. Pt reports compliance w/ HEP . Pt reported and demonstrated limited LB rom d/t pain . Pt reported prolonged activity remains limited by pain, at which time she needs to take a seated rest, to alleviate it. Pt to continue w/ HEP for management of symptoms.        Home Exercise Program:  provided 2/15/21 .  3/3/21 pt demonstrated  I  W/ HEP to therapist.    Manual Treatments:  NA    Modalities:    Timed Code Treatment Minutes:  38    Total Treatment Minutes: 38    Treatment/Activity Tolerance: [x] Patient tolerated treatment well [] Patient limited by fatique  [] Patient limited by pain  [] Patient limited by other medical complications  [] Other:     Prognosis: [x] Good [] Fair  [] Poor    Patient Requires Follow-up: [] Yes  [x] No    Plan:   [] Continue per plan of care [] Alter current plan (see comments)  [] Plan of care initiated [] Hold pending MD visit [x] Discharge  Plan for Next Session:      See Weekly Progress Note: []  Yes  [x]  No  Next due:        Electronically signed by:  Erika Mcmillan PTA [id number]

## 2021-03-05 ENCOUNTER — OFFICE VISIT (OUTPATIENT)
Dept: ENT CLINIC | Age: 46
End: 2021-03-05
Payer: COMMERCIAL

## 2021-03-05 ENCOUNTER — OFFICE VISIT (OUTPATIENT)
Dept: PAIN MANAGEMENT | Age: 46
End: 2021-03-05
Payer: COMMERCIAL

## 2021-03-05 VITALS
RESPIRATION RATE: 16 BRPM | WEIGHT: 232 LBS | SYSTOLIC BLOOD PRESSURE: 122 MMHG | HEART RATE: 78 BPM | BODY MASS INDEX: 39.61 KG/M2 | TEMPERATURE: 97.1 F | HEIGHT: 64 IN | OXYGEN SATURATION: 98 % | DIASTOLIC BLOOD PRESSURE: 78 MMHG

## 2021-03-05 VITALS
HEIGHT: 64 IN | BODY MASS INDEX: 39.61 KG/M2 | SYSTOLIC BLOOD PRESSURE: 137 MMHG | HEART RATE: 69 BPM | WEIGHT: 232 LBS | DIASTOLIC BLOOD PRESSURE: 89 MMHG

## 2021-03-05 DIAGNOSIS — J34.2 DNS (DEVIATED NASAL SEPTUM): ICD-10-CM

## 2021-03-05 DIAGNOSIS — J30.9 ALLERGIC RHINITIS, UNSPECIFIED SEASONALITY, UNSPECIFIED TRIGGER: Primary | ICD-10-CM

## 2021-03-05 DIAGNOSIS — M47.816 LUMBAR SPONDYLOSIS: ICD-10-CM

## 2021-03-05 DIAGNOSIS — M51.9 LUMBAR DISC DISORDER: Primary | ICD-10-CM

## 2021-03-05 DIAGNOSIS — M79.662 PAIN IN LEFT LOWER LEG: ICD-10-CM

## 2021-03-05 DIAGNOSIS — M54.16 LUMBAR RADICULOPATHY: ICD-10-CM

## 2021-03-05 DIAGNOSIS — G89.4 CHRONIC PAIN SYNDROME: ICD-10-CM

## 2021-03-05 PROCEDURE — 99213 OFFICE O/P EST LOW 20 MIN: CPT | Performed by: PAIN MEDICINE

## 2021-03-05 PROCEDURE — 99213 OFFICE O/P EST LOW 20 MIN: CPT | Performed by: NURSE PRACTITIONER

## 2021-03-05 RX ORDER — TIZANIDINE 4 MG/1
4 TABLET ORAL DAILY PRN
Qty: 30 TABLET | Refills: 0 | OUTPATIENT
Start: 2021-03-05 | End: 2021-04-04

## 2021-03-05 ASSESSMENT — ENCOUNTER SYMPTOMS
STRIDOR: 0
SHORTNESS OF BREATH: 0
SORE THROAT: 0
SINUS PAIN: 0
EYES NEGATIVE: 1
RESPIRATORY NEGATIVE: 1
SINUS PRESSURE: 0
RHINORRHEA: 1

## 2021-03-05 NOTE — PROGRESS NOTES
[address] [facility], [address] [place] [address] [place]  [phone]    Follow up Note      Janina Barrios     Date of Visit:  3/5/2021    CC:  Patient presents for follow up   Chief Complaint   Patient presents with    Follow-up     lower back into left buttock down left leg to mid thigh     HPI:    Pain is better. Appropriate analgesia with current medications regimen: N/A. Change in quality of symptoms:no. Medication side effects:not applicable . Recent diagnostic testing:none. Recent interventional procedures:none. .    She has not been on anticoagulation medications to include none. The patient  has not been on herbal supplements. The patient is not diabetic. Imaging:   Lumbar spine Xray :  L-spine: No fracture or dislocation.  Mild degenerative disc disease in the   low T-spine.  Suspected calcified uterine fibroid noted. Pelvis and left hip: No fracture or dislocation.  Hip joints appear   preserved.  No additional significant abnormal findings. Left hip Xray :  Normal     Previous treatments: medications. .         Potential Aberrant Drug-Related Behavior:    No    Urine Drug Screening:  None    OARRS report:  2021 consistent     Opioid Agreement:  Date enacted:N/A  Renewal date:N/A    Past Medical History:   Diagnosis Date    Abnormal Pap smear of cervix     Allergic rhinitis     seasonal    Asthma     Gastritis      Past Surgical History:   Procedure Laterality Date    CERVIX BIOPSY       SECTION      OTHER SURGICAL HISTORY      cervical conization    TUBAL LIGATION      UPPER GASTROINTESTINAL ENDOSCOPY      due to gastritis     Prior to Admission medications    Medication Sig Start Date End Date Taking?  Authorizing Provider   Ciclopirox 1 % SHAM [address] EVERY OTHER DAY AND LET SIT FOR 5 MINUTES PRIOR TO RINSING 21  Yes Historical Provider, MD tiZANidine (ZANAFLEX) 4 MG tablet Take 1 tablet by mouth daily as needed (muscle spasms) 2/11/21 3/13/21 Yes Kamille Galdamez MD   MULTIPLE VITAMIN PO Take by mouth   Yes Historical Provider, MD   Probiotic Product (PROBIOTIC-10 PO) Take by mouth   Yes Historical Provider, MD   fluticasone (FLONASE) 50 MCG/ACT nasal spray 2 sprays by Each Nostril route daily 21  Yes HEATHER Lanza CNP   azelastine (ASTELIN) 0.1 % nasal spray 2 sprays by Nasal route 2 times daily Use in each nostril as directed 21  Yes HEATHER Lanza CNP   fluticasone-salmeterol (ADVAIR HFA) 115-21 MCG/ACT inhaler Inhale 2 puffs into the lungs 2 times daily 21  Yes Isauro Villafana DO   albuterol sulfate  (90 Base) MCG/ACT inhaler Inhale 2 puffs into the lungs every 6 hours as needed for Wheezing 6/10/19  Yes Yoli Werner DO     No Known Allergies    Social History     Socioeconomic History    Marital status: Single     Spouse name: Not on file    Number of children: Not on file    Years of education: Not on file    Highest education level: Not on file   Occupational History    Not on file   Social Needs    Financial resource strain: Not on file    Food insecurity     Worry: Not on file     Inability: Not on file    Transportation needs     Medical: Not on file     Non-medical: Not on file   Tobacco Use    Smoking status: Former Smoker     Packs/day: 0.30     Years: 7.00     Pack years: 2.10     Types: Cigarettes     Start date:      Quit date:      Years since quittin.1    Smokeless tobacco: Never Used   Substance and Sexual Activity    Alcohol use: Yes     Comment: social    Drug use: No    Sexual activity: Not Currently     Partners: Male     Comment: tubal ligation    Lifestyle    Physical activity     Days per week: Not on file     Minutes per session: Not on file    Stress: Not on file   Relationships    Social connections     Talks on phone: Not on file We discussed with the patient that combining opioids, benzodiazepines, alcohol, illicit drugs or sleep aids increases the risk of respiratory depression including death. We discussed that these medications may cause drowsiness, sedation or dizziness and have counseled the patient not to drive or operate machinery. We have discussed that these medications will be prescribed only by one provider. We have discussed with the patient about age related risk factors and have thoroughly discussed the importance of taking these medications as prescribed. The patient verbalizes understanding. ccrefcha Mayberry M.D.

## 2021-03-05 NOTE — PROGRESS NOTES
Do you currently have any of the following:    Fever: No  Headache:  No  Cough: No  Shortness of breath: No  Exposed to anyone with these symptoms: No                                                                                                                Lydia Jonathon presents to the [facility] on 3/5/2021. Katrina Orta is complaining of pain lower back left buttock and down left leg to mid thigh. The pain is intermittent. The pain is described as shooting, sharp and numb. Pain is rated on her best day at a 1, on her worst day at a 10, and on average at a 3 on the VAS scale. She took her last dose of Motrin, Tylenol and today and zanaflex at night/ . Katrina Orta does have issues with constipation. Any procedures since your last visit: No, with  % relief. She is  on NSAIDS and  is not on anticoagulation medications to include none and is managed by . Pacemaker or defibrillator: No Physician managing device is . Medication Contract and Consent for Opioid Use Documents Filed      No documents found                   /78   Pulse 78   Temp 97.1 °F (36.2 °C) (Infrared)   Resp 16   Ht 5' 4\" (1.626 m)   Wt 232 lb (105.2 kg)   SpO2 98%   BMI 39.82 kg/m²      No LMP recorded.

## 2021-03-05 NOTE — PROGRESS NOTES
[facility] Otolaryngology  Dr. Aarti Esqueda. Андрей Garcia. Ms.Ed        Patient Name:  Tomas Polo  :  1975     CHIEF C/O:    Chief Complaint   Patient presents with    Nasal Congestion     a lot of nasal drainage for years and has gotten better since last visit        HISTORY OBTAINED FROM:  patient    HISTORY OF PRESENT ILLNESS:       Gaurav Leach is a 39y.o. year old female, here today for follow up of nasal congestion. She was last seen 6 weeks ago and placed on Flonase and Astelin sprays which she states has provided moderate relief of her congestion rhinorrhea and postnasal drainage. She states that she continues to feel mildly dry with some crusting nasally and slight postnasal drainage. Is any current pain or pressure in the sinuses. She states her throat has become less irritated with decrease in postnasal drainage. She denies any recent fevers or current antibiotic usage.           Past Medical History:   Diagnosis Date    Abnormal Pap smear of cervix     Allergic rhinitis     seasonal    Asthma     Gastritis      Past Surgical History:   Procedure Laterality Date    CERVIX BIOPSY       SECTION      OTHER SURGICAL HISTORY      cervical conization    TUBAL LIGATION      UPPER GASTROINTESTINAL ENDOSCOPY      due to gastritis       Current Outpatient Medications:     Ciclopirox 1 % SHAM, WASH EVERY OTHER DAY AND LET SIT FOR 5 MINUTES PRIOR TO RINSING, Disp: , Rfl:     tiZANidine (ZANAFLEX) 4 MG tablet, Take 1 tablet by mouth daily as needed (muscle spasms), Disp: 30 tablet, Rfl: 0    MULTIPLE VITAMIN PO, Take by mouth, Disp: , Rfl:     Probiotic Product (PROBIOTIC-10 PO), Take by mouth, Disp: , Rfl:     fluticasone (FLONASE) 50 MCG/ACT nasal spray, 2 sprays by Each Nostril route daily, Disp: 2 Bottle, Rfl: 1    azelastine (ASTELIN) 0.1 % nasal spray, 2 sprays by Nasal route 2 times daily Use in each nostril as directed, Disp: 1 Bottle, Rfl: 1    fluticasone-salmeterol (ADVAIR HFA) 115-21 MCG/ACT inhaler, Inhale 2 puffs into the lungs 2 times daily, Disp: 1 Inhaler, Rfl: 12    albuterol sulfate  (90 Base) MCG/ACT inhaler, Inhale 2 puffs into the lungs every 6 hours as needed for Wheezing, Disp: 1 Inhaler, Rfl: 5  Patient has no known allergies. Social History     Tobacco Use    Smoking status: Former Smoker     Packs/day: 0.30     Years: 7.00     Pack years: 2.10     Types: Cigarettes     Start date:      Quit date:      Years since quittin.1    Smokeless tobacco: Never Used   Substance Use Topics    Alcohol use: Yes     Comment: social    Drug use: No     Family History   Problem Relation Age of Onset    Other Mother         uterine fibroids    Hypertension Father     High Cholesterol Father        Review of Systems   Constitutional: Negative. Negative for activity change and appetite change. HENT: Positive for congestion, postnasal drip and rhinorrhea. Negative for ear discharge, ear pain, sinus pressure, sinus pain and sore throat. Eyes: Negative. Respiratory: Negative. Negative for shortness of breath and stridor. Cardiovascular: Negative. Negative for chest pain and palpitations. Endocrine: Negative. Genitourinary: Negative. Musculoskeletal: Negative. Skin: Negative. Neurological: Negative. Negative for dizziness. Hematological: Negative. Psychiatric/Behavioral: Negative. /89   Pulse 69   Ht 5' 4\" (1.626 m)   Wt 232 lb (105.2 kg)   BMI 39.82 kg/m²   Physical Exam  Constitutional:       Appearance: Normal appearance. HENT:      Head: Normocephalic. Right Ear: Tympanic membrane, ear canal and external ear normal.      Left Ear: Tympanic membrane, ear canal and external ear normal.      Nose:     Eyes:      Conjunctiva/sclera: Conjunctivae normal.      Pupils: Pupils are equal, round, and reactive to light. Neck:      Musculoskeletal: Normal range of motion. No neck rigidity or muscular tenderness. Cardiovascular:      Rate and Rhythm: Normal rate and regular rhythm. Pulses: Normal pulses. Pulmonary:      Effort: Pulmonary effort is normal. No respiratory distress. Breath sounds: No stridor. Musculoskeletal: Normal range of motion. Skin:     General: Skin is warm and dry. Neurological:      General: No focal deficit present. Mental Status: She is alert and oriented to person, place, and time. Psychiatric:         Mood and Affect: Mood normal.         Behavior: Behavior normal.         Thought Content: Thought content normal.         Judgment: Judgment normal.         IMPRESSION/PLAN:      Romel Mcneal was seen today for nasal congestion. Diagnoses and all orders for this visit:    Allergic rhinitis, unspecified seasonality, unspecified trigger    DNS (deviated nasal septum)    Romel Mcneal is seen today for follow-up of allergic rhinitis symptoms. Upon exam sinuses are patent bilaterally with decreased swelling and pallor appearance. There is still mild swelling and pallor appearance present with dry appearance overall. There is no rhinorrhea present with significant decrease in postnasal drainage. There is no erythema or other exudate within the oropharynx. At this time the patient will continue with her current regimen of Astelin and Flonase sprays with addition to nasal saline several times daily for hydration. She agrees to this plan and will be seen in 3 months for follow-up. He is instructed to call with any new or worsening of symptoms prior to her next appointment.     Grady Rm, MSN, FNP-C  8 [facility], Nose and Throat    The information contained in this note has been dictated using drug and medical speech recognition software and may contain errors

## 2021-03-08 DIAGNOSIS — J30.9 ALLERGIC RHINITIS, UNSPECIFIED SEASONALITY, UNSPECIFIED TRIGGER: Primary | ICD-10-CM

## 2021-03-08 RX ORDER — AZELASTINE 1 MG/ML
SPRAY, METERED NASAL
Qty: 1 BOTTLE | Refills: 1 | Status: SHIPPED
Start: 2021-03-08 | End: 2021-08-30

## 2021-03-11 NOTE — PROGRESS NOTES
[address] [facility]                Phone: [phone]   Fax: [phone]    To:  Dr. Berhane Sanders       From: Bayron Linder      Patient: Nasir Radford       : 3/6/1944  Diagnosis:       Date: 3/11/2021  Treatment Diagnosis: chronic LBP     Physical Therapy Discharge Note    Total Visits to date:   6 Cancels/No-shows to date:      Plan of Care/Treatment to date:  [x] Therapeutic Exercise    [x] Modalities:  [x] Therapeutic Activity     [] Ultrasound  [x] Electrical Stimulation  [] Gait Training      [] Cervical Traction   [] Lumbar Traction  [] Neuromuscular Re-education  [x] Cold/hotpack [] Iontophoresis  [x] Instruction in HEP      Other:  [] Manual Therapy       []    [] Aquatic Therapy       []                            Progress towards goals:  pt reached all stated functional LTG's for therapy and was I with HEP while pain persisted across trunk     Goal Status:  [] Achieved [x] Partially Achieved  [] Not Achieved     Patient Status: [] Continue per initial plan of Care     [x] Patient now discharged to [facility] for home management of condition     [] Additional visits requested, Please re-certify for additional visits:          Electronically signed by: PEPITO Norris     If you have any questions or concerns, please don't hesitate to call.   Thank you for your referral.

## 2021-06-12 DIAGNOSIS — J30.9 ALLERGIC RHINITIS, UNSPECIFIED SEASONALITY, UNSPECIFIED TRIGGER: Primary | ICD-10-CM

## 2021-06-14 RX ORDER — FLUTICASONE PROPIONATE 50 MCG
SPRAY, SUSPENSION (ML) NASAL
Qty: 1 BOTTLE | Refills: 1 | Status: SHIPPED
Start: 2021-06-14 | End: 2021-07-06

## 2021-07-06 DIAGNOSIS — J30.9 ALLERGIC RHINITIS, UNSPECIFIED SEASONALITY, UNSPECIFIED TRIGGER: ICD-10-CM

## 2021-07-06 RX ORDER — FLUTICASONE PROPIONATE 50 MCG
SPRAY, SUSPENSION (ML) NASAL
Qty: 1 BOTTLE | Refills: 0 | Status: SHIPPED
Start: 2021-07-06 | End: 2021-08-02

## 2021-07-20 ENCOUNTER — OFFICE VISIT (OUTPATIENT)
Dept: ENT CLINIC | Age: 46
End: 2021-07-20
Payer: COMMERCIAL

## 2021-07-20 VITALS
SYSTOLIC BLOOD PRESSURE: 125 MMHG | HEART RATE: 77 BPM | WEIGHT: 232 LBS | DIASTOLIC BLOOD PRESSURE: 88 MMHG | HEIGHT: 64 IN | BODY MASS INDEX: 39.61 KG/M2

## 2021-07-20 DIAGNOSIS — J30.9 ALLERGIC RHINITIS, UNSPECIFIED SEASONALITY, UNSPECIFIED TRIGGER: Primary | ICD-10-CM

## 2021-07-20 DIAGNOSIS — J34.2 DNS (DEVIATED NASAL SEPTUM): ICD-10-CM

## 2021-07-20 DIAGNOSIS — R09.82 POST-NASAL DRAINAGE: ICD-10-CM

## 2021-07-20 PROCEDURE — 99213 OFFICE O/P EST LOW 20 MIN: CPT | Performed by: NURSE PRACTITIONER

## 2021-07-20 NOTE — PROGRESS NOTES
[facility] Otolaryngology  Dr. Daniella Horne. Adolfo Whitfield. Ms.Ed        Patient Name:  Jose Solis  :  1975     CHIEF C/O:    Chief Complaint   Patient presents with    Follow-up     states that she is seeing some improvement. stil having drainage but is better        HISTORY OBTAINED FROM:  patient    HISTORY OF PRESENT ILLNESS:       Loly Poole is a 55y.o. year old female, here today for follow up of allergy symptoms. Last seen 4 months ago  Using flonase, astelin, xyzal and nasal saline  Continues to have post nasal drainage  Seems more intermittent but is improved  No current sinus pain or pressure  No rhinorrhea  No congestion  No recent fevers or antibiotics            Past Medical History:   Diagnosis Date    Abnormal Pap smear of cervix     Allergic rhinitis     seasonal    Asthma     Gastritis      Past Surgical History:   Procedure Laterality Date    CERVIX BIOPSY       SECTION      OTHER SURGICAL HISTORY      cervical conization    TUBAL LIGATION      UPPER GASTROINTESTINAL ENDOSCOPY      due to gastritis       Current Outpatient Medications:     fluticasone (FLONASE) 50 MCG/ACT nasal spray, SPRAY 2 SPRAYS INTO EACH NOSTRIL EVERY DAY, Disp: 1 Bottle, Rfl: 0    azelastine (ASTELIN) 0.1 % nasal spray, USE 2 SPRAYS IN EACH NOSTRIL TWICE A DAY AS DIRECTED, Disp: 1 Bottle, Rfl: 1    Ciclopirox 1 % SHAM, WASH EVERY OTHER DAY AND LET SIT FOR 5 MINUTES PRIOR TO RINSING, Disp: , Rfl:     MULTIPLE VITAMIN PO, Take by mouth, Disp: , Rfl:     Probiotic Product (PROBIOTIC-10 PO), Take by mouth, Disp: , Rfl:     fluticasone-salmeterol (ADVAIR HFA) 115-21 MCG/ACT inhaler, Inhale 2 puffs into the lungs 2 times daily, Disp: 1 Inhaler, Rfl: 12    albuterol sulfate  (90 Base) MCG/ACT inhaler, Inhale 2 puffs into the lungs every 6 hours as needed for Wheezing, Disp: 1 Inhaler, Rfl: 5  Patient has no known allergies.   Social History     Tobacco Use    Smoking status: Former Smoker Packs/day: 0.30     Years: 7.00     Pack years: 2.10     Types: Cigarettes     Start date:      Quit date:      Years since quittin.5    Smokeless tobacco: Never Used   Substance Use Topics    Alcohol use: Yes     Comment: social    Drug use: No     Family History   Problem Relation Age of Onset    Other Mother         uterine fibroids    Hypertension Father     High Cholesterol Father        Review of Systems   Constitutional: Negative. Negative for activity change and appetite change. HENT: Positive for postnasal drip. Negative for congestion, rhinorrhea, sinus pressure and sinus pain. Eyes: Negative. Respiratory: Negative. Negative for shortness of breath and stridor. Cardiovascular: Negative. Negative for chest pain and palpitations. Endocrine: Negative. Musculoskeletal: Negative. Skin: Negative. Neurological: Negative. Negative for dizziness. Hematological: Negative. Psychiatric/Behavioral: Negative. /88   Pulse 77   Ht 5' 4\" (1.626 m)   Wt 232 lb (105.2 kg)   BMI 39.82 kg/m²   Physical Exam  Constitutional:       Appearance: Normal appearance. HENT:      Head: Normocephalic. Right Ear: Tympanic membrane, ear canal and external ear normal.      Left Ear: Tympanic membrane, ear canal and external ear normal.      Nose: Nose normal. No rhinorrhea. Right Turbinates: Swollen and pale. Left Turbinates: Swollen and pale. Mouth/Throat:      Lips: Pink. Mouth: Mucous membranes are moist.     Eyes:      Conjunctiva/sclera: Conjunctivae normal.      Pupils: Pupils are equal, round, and reactive to light. Cardiovascular:      Rate and Rhythm: Normal rate and regular rhythm. Pulses: Normal pulses. Pulmonary:      Effort: Pulmonary effort is normal. No respiratory distress. Breath sounds: No stridor. Musculoskeletal:         General: Normal range of motion. Cervical back: Normal range of motion. No rigidity. No muscular tenderness. Skin:     General: Skin is warm and dry. Neurological:      General: No focal deficit present. Mental Status: She is alert and oriented to person, place, and time. Psychiatric:         Mood and Affect: Mood normal.         Behavior: Behavior normal.         Thought Content: Thought content normal.         Judgment: Judgment normal.         IMPRESSION/PLAN:    Gab Ariza was seen today for follow-up. Diagnoses and all orders for this visit:    Allergic rhinitis, unspecified seasonality, unspecified trigger    DNS (deviated nasal septum)    Post-nasal drainage      Patient will continue with her current regimen of Flonase, Astelin, Xyzal and nasal saline. Recommended that patient begin using sinus rinses once daily when suffering from increased postnasal drainage. She will otherwise follow-up in 6 months. She is instructed to call with any new or worsening symptoms prior to her next appointment.         Eli Scott, ROBERT, FNP-C  8 [facility], Nose and Throat    The information contained in this note has been dictated using drug and medical speech recognition software and may contain errors

## 2021-07-26 ASSESSMENT — ENCOUNTER SYMPTOMS
SINUS PAIN: 0
EYES NEGATIVE: 1
RHINORRHEA: 0
SINUS PRESSURE: 0
STRIDOR: 0
SHORTNESS OF BREATH: 0
RESPIRATORY NEGATIVE: 1

## 2021-07-30 ENCOUNTER — OFFICE VISIT (OUTPATIENT)
Dept: PRIMARY CARE CLINIC | Age: 46
End: 2021-07-30
Payer: COMMERCIAL

## 2021-07-30 VITALS — DIASTOLIC BLOOD PRESSURE: 78 MMHG | SYSTOLIC BLOOD PRESSURE: 134 MMHG

## 2021-07-30 DIAGNOSIS — F51.01 PRIMARY INSOMNIA: Primary | ICD-10-CM

## 2021-07-30 DIAGNOSIS — J45.20 MILD INTERMITTENT ASTHMA WITHOUT COMPLICATION: ICD-10-CM

## 2021-07-30 PROCEDURE — 99213 OFFICE O/P EST LOW 20 MIN: CPT | Performed by: FAMILY MEDICINE

## 2021-07-30 RX ORDER — TRAZODONE HYDROCHLORIDE 50 MG/1
TABLET ORAL
Qty: 60 TABLET | Refills: 5 | Status: SHIPPED | OUTPATIENT
Start: 2021-07-30

## 2021-07-30 ASSESSMENT — PATIENT HEALTH QUESTIONNAIRE - PHQ9
SUM OF ALL RESPONSES TO PHQ QUESTIONS 1-9: 0
2. FEELING DOWN, DEPRESSED OR HOPELESS: 0
SUM OF ALL RESPONSES TO PHQ QUESTIONS 1-9: 0
1. LITTLE INTEREST OR PLEASURE IN DOING THINGS: 0
SUM OF ALL RESPONSES TO PHQ9 QUESTIONS 1 & 2: 0
SUM OF ALL RESPONSES TO PHQ QUESTIONS 1-9: 0

## 2021-07-30 NOTE — PROGRESS NOTES
21  Name: Jm Trevino    : 1975    Sex: female    Age: 55 y.o. 3sleep    Subjective:  Chief Complaint: Patient is here for sleep     Tried mul meds        adnnot help     bak better with dr Cheryl Mills and  pt      Review of Systems   Constitutional: Negative. HENT: Negative. Eyes: Negative. Respiratory: Negative. Cardiovascular: Negative. Gastrointestinal: Negative. Endocrine: Negative. Genitourinary: Negative. Musculoskeletal: Negative. Skin: Negative. Allergic/Immunologic: Negative. Neurological: Negative. Hematological: Negative. Psychiatric/Behavioral: Positive for sleep disturbance.          Current Outpatient Medications:     traZODone (DESYREL) 50 MG tablet, One q hs prn sleep----inc to  Two q hs if needed, Disp: 60 tablet, Rfl: 5    fluticasone (FLONASE) 50 MCG/ACT nasal spray, SPRAY 2 SPRAYS INTO EACH NOSTRIL EVERY DAY, Disp: 1 Bottle, Rfl: 0    azelastine (ASTELIN) 0.1 % nasal spray, USE 2 SPRAYS IN EACH NOSTRIL TWICE A DAY AS DIRECTED, Disp: 1 Bottle, Rfl: 1    Ciclopirox 1 % SHAM, WASH EVERY OTHER DAY AND LET SIT FOR 5 MINUTES PRIOR TO RINSING, Disp: , Rfl:     MULTIPLE VITAMIN PO, Take by mouth, Disp: , Rfl:     Probiotic Product (PROBIOTIC-10 PO), Take by mouth, Disp: , Rfl:     fluticasone-salmeterol (ADVAIR HFA) 115-21 MCG/ACT inhaler, Inhale 2 puffs into the lungs 2 times daily, Disp: 1 Inhaler, Rfl: 12    albuterol sulfate  (90 Base) MCG/ACT inhaler, Inhale 2 puffs into the lungs every 6 hours as needed for Wheezing, Disp: 1 Inhaler, Rfl: 5  No Known Allergies  Social History     Socioeconomic History    Marital status: Single     Spouse name: Not on file    Number of children: Not on file    Years of education: Not on file    Highest education level: Not on file   Occupational History    Not on file   Tobacco Use    Smoking status: Former Smoker     Packs/day: 0.30     Years: 7.00     Pack years: 2.10     Types: Cigarettes History:   Procedure Laterality Date    CERVIX BIOPSY       SECTION      OTHER SURGICAL HISTORY      cervical conization    TUBAL LIGATION      UPPER GASTROINTESTINAL ENDOSCOPY      due to gastritis      Vitals:    21 1059   BP: 134/78       Objective:    Physical Exam  Vitals reviewed. Constitutional:       Appearance: She is well-developed. She is obese. HENT:      Head: Normocephalic. Eyes:      Pupils: Pupils are equal, round, and reactive to light. Cardiovascular:      Rate and Rhythm: Normal rate and regular rhythm. Pulmonary:      Effort: Pulmonary effort is normal.      Breath sounds: Normal breath sounds. Abdominal:      Palpations: Abdomen is soft. Musculoskeletal:         General: Normal range of motion. Cervical back: Normal range of motion. Skin:     General: Skin is warm. Neurological:      Mental Status: She is alert and oriented to person, place, and time. Psychiatric:         Behavior: Behavior normal.         Diagnoses and all orders for this visit:    Primary insomnia  -     traZODone (DESYREL) 50 MG tablet; One q hs prn sleep----inc to  Two q hs if needed    Mild intermittent asthma without complication        Comments:  tradoen     If not help   Add meltomnin or      dramine to it  To  gre then psych       A great deal of time spent reviewing medications, diet, exercise, social issues. Also reviewing the chart before entering the room with patient and finishing charting after leaving patient's room. More than half of that time was spent face to face with the patient in counseling and coordinating care. Follow Up: No follow-ups on file.      Seen by:  Lanette Dietz DO

## 2021-08-04 ENCOUNTER — TELEPHONE (OUTPATIENT)
Dept: ENT CLINIC | Age: 46
End: 2021-08-04

## 2021-08-04 RX ORDER — FLUTICASONE PROPIONATE 50 MCG
2 SPRAY, SUSPENSION (ML) NASAL DAILY
Qty: 3 BOTTLE | Refills: 1 | Status: SHIPPED
Start: 2021-08-04 | End: 2022-02-08 | Stop reason: SDUPTHER

## 2021-09-03 ENCOUNTER — OFFICE VISIT (OUTPATIENT)
Dept: PRIMARY CARE CLINIC | Age: 46
End: 2021-09-03
Payer: COMMERCIAL

## 2021-09-03 VITALS
TEMPERATURE: 97.5 F | SYSTOLIC BLOOD PRESSURE: 132 MMHG | RESPIRATION RATE: 16 BRPM | WEIGHT: 224 LBS | DIASTOLIC BLOOD PRESSURE: 78 MMHG | HEIGHT: 64 IN | BODY MASS INDEX: 38.24 KG/M2 | OXYGEN SATURATION: 99 %

## 2021-09-03 DIAGNOSIS — J45.40 MODERATE PERSISTENT ASTHMA WITHOUT COMPLICATION: Primary | Chronic | ICD-10-CM

## 2021-09-03 DIAGNOSIS — I10 ESSENTIAL HYPERTENSION: Chronic | ICD-10-CM

## 2021-09-03 PROCEDURE — 99213 OFFICE O/P EST LOW 20 MIN: CPT | Performed by: FAMILY MEDICINE

## 2021-09-03 RX ORDER — PREDNISONE 10 MG/1
TABLET ORAL
Qty: 18 TABLET | Refills: 0 | Status: SHIPPED
Start: 2021-09-03 | End: 2022-04-19

## 2021-09-03 ASSESSMENT — ENCOUNTER SYMPTOMS
GASTROINTESTINAL NEGATIVE: 1
EYES NEGATIVE: 1
SHORTNESS OF BREATH: 0
WHEEZING: 1
ALLERGIC/IMMUNOLOGIC NEGATIVE: 1

## 2021-09-03 ASSESSMENT — PATIENT HEALTH QUESTIONNAIRE - PHQ9
2. FEELING DOWN, DEPRESSED OR HOPELESS: 0
SUM OF ALL RESPONSES TO PHQ QUESTIONS 1-9: 0
SUM OF ALL RESPONSES TO PHQ9 QUESTIONS 1 & 2: 0
SUM OF ALL RESPONSES TO PHQ QUESTIONS 1-9: 0
SUM OF ALL RESPONSES TO PHQ QUESTIONS 1-9: 0
1. LITTLE INTEREST OR PLEASURE IN DOING THINGS: 0

## 2021-09-03 NOTE — PROGRESS NOTES
9/3/21  Name: Deisi York    : 1975    Sex: female    Age: 55 y.o.      asthma  Subjective:  Chief Complaint: Patient is here for asthma     Sl flair  f ew weeks   Of  Asthma  No cough  suign  advisr  Qd     Last week inc to bid      usign  Albuterol in betwwen  Roughly     onc eor twice  A dsay      Review of Systems   Constitutional: Negative. HENT: Negative. Eyes: Negative. Respiratory: Positive for wheezing. Negative for shortness of breath. Cardiovascular: Negative. Negative for chest pain, palpitations and leg swelling. Gastrointestinal: Negative. Endocrine: Negative. Genitourinary: Negative. Musculoskeletal: Negative. Skin: Negative. Allergic/Immunologic: Negative. Neurological: Negative. Hematological: Negative. Psychiatric/Behavioral: Negative.           Current Outpatient Medications:     predniSONE (DELTASONE) 10 MG tablet, ONE TID FOR THREE DAYS, ONE BID FOR THREE DAYS, ONE QD FOR THREE DAYS   FOOD, Disp: 18 tablet, Rfl: 0    azelastine (ASTELIN) 0.1 % nasal spray, USE 2 SPRAYS IN EACH NOSTRIL TWICE A DAY AS DIRECTED, Disp: 1 Bottle, Rfl: 3    fluticasone (FLONASE) 50 MCG/ACT nasal spray, 2 sprays by Nasal route daily 2 sprays each nostril once daily, Disp: 3 Bottle, Rfl: 1    traZODone (DESYREL) 50 MG tablet, One q hs prn sleep----inc to  Two q hs if needed, Disp: 60 tablet, Rfl: 5    Ciclopirox 1 % SHAM, WASH EVERY OTHER DAY AND LET SIT FOR 5 MINUTES PRIOR TO RINSING, Disp: , Rfl:     MULTIPLE VITAMIN PO, Take by mouth, Disp: , Rfl:     Probiotic Product (PROBIOTIC-10 PO), Take by mouth, Disp: , Rfl:     fluticasone-salmeterol (ADVAIR HFA) 115-21 MCG/ACT inhaler, Inhale 2 puffs into the lungs 2 times daily, Disp: 1 Inhaler, Rfl: 12    albuterol sulfate  (90 Base) MCG/ACT inhaler, Inhale 2 puffs into the lungs every 6 hours as needed for Wheezing, Disp: 1 Inhaler, Rfl: 5  No Known Allergies  Social History     Socioeconomic History    Marital status: Single     Spouse name: Not on file    Number of children: Not on file    Years of education: Not on file    Highest education level: Not on file   Occupational History    Not on file   Tobacco Use    Smoking status: Former Smoker     Packs/day: 0.30     Years: 7.00     Pack years: 2.10     Types: Cigarettes     Start date:      Quit date:      Years since quittin.6    Smokeless tobacco: Never Used   Substance and Sexual Activity    Alcohol use: Yes     Comment: social    Drug use: No    Sexual activity: Not Currently     Partners: Male     Comment: tubal ligation 2008   Other Topics Concern    Not on file   Social History Narrative    New patient  with complaints of mucus in her throat    Single with 3 children. 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 Smoker quitting age 22    Seasonal allergies    Asthma    GERD    Elevated blood pressure      Referred to  ent  for throat irritation    Low back pain      referred to [facility]    Insomnia        Social Determinants of Health     Financial Resource Strain:     Difficulty of Paying Living Expenses:    Food Insecurity:     Worried About Running Out of Food in the Last Year:     Ran Out of Food in the Last Year:    Transportation Needs:     Lack of Transportation (Medical):      Lack of Transportation (Non-Medical):    Physical Activity:     Days of Exercise per Week:     Minutes of Exercise per Session:    Stress:     Feeling of Stress :    Social Connections:     Frequency of Communication with Friends and Family:     Frequency of Social Gatherings with Friends and Family:     Attends [employer] Services:     Active Member of Clubs or Organizations:     Attends Club or Organization Meetings:     Marital Status:    Intimate Partner Violence:     Fear of Current or Ex-Partner:     Emotionally Abused:     Physically Abused:     Sexually Abused:       Past Medical History:   Diagnosis Date    Abnormal Pap smear of cervix     Allergic rhinitis     seasonal    Asthma     Gastritis      Family History   Problem Relation Age of Onset    Other Mother         uterine fibroids    Hypertension Father     High Cholesterol Father       Past Surgical History:   Procedure Laterality Date    CERVIX BIOPSY       SECTION      OTHER SURGICAL HISTORY      cervical conization    TUBAL LIGATION      UPPER GASTROINTESTINAL ENDOSCOPY      due to gastritis      Vitals:    21 1451   BP: 132/78   Resp: 16   Temp: 97.5 °F (36.4 °C)   SpO2: 99%   Weight: 224 lb (101.6 kg)   Height: 5' 4\" (1.626 m)       Objective:    Physical Exam  Vitals reviewed. Constitutional:       Appearance: She is well-developed. HENT:      Head: Normocephalic. Eyes:      Pupils: Pupils are equal, round, and reactive to light. Cardiovascular:      Rate and Rhythm: Normal rate and regular rhythm. Pulmonary:      Effort: Pulmonary effort is normal.      Breath sounds: Wheezing (faint wehezing bases) present. Abdominal:      Palpations: Abdomen is soft. Musculoskeletal:         General: Normal range of motion. Cervical back: Normal range of motion. Skin:     General: Skin is warm. Neurological:      Mental Status: She is alert and oriented to person, place, and time. Psychiatric:         Behavior: Behavior normal.         Masood Kruger was seen today for asthma. Diagnoses and all orders for this visit:    Moderate persistent asthma without complication  -     Hal[place] - Rose Mary Thornton DO Pulmonary, Robyn  -     predniSONE (DELTASONE) 10 MG tablet; ONE TID FOR THREE DAYS, ONE BID FOR THREE DAYS, ONE QD FOR THREE DAYS   FOOD    Essential hypertension        Comments: add pred course  aptp pulm   If worse toe r  If nto    Great  See me      A great deal of time spent reviewing medications, diet, exercise, social issues.  Also reviewing the chart before entering the room with patient and finishing charting after leaving patient's room. More than half of that time was spent face to face with the patient in counseling and coordinating care.       Follow Up: Return for Reg Appt, See Referral.     Seen by:  Thania Santoyo DO

## 2021-09-09 ENCOUNTER — TELEPHONE (OUTPATIENT)
Dept: PRIMARY CARE CLINIC | Age: 46
End: 2021-09-09

## 2021-09-09 NOTE — TELEPHONE ENCOUNTER
----- Message from FlameStower sent at 9/9/2021  3:42 PM EDT -----  Subject: Message to Provider    QUESTIONS  Information for Provider? Pt was seen on 9/3 for breathing issues and it   still hasn't gotten any better. The doctor mentioned calling in an   antibiotic, pt uses [facility] on [address]. Pt is curious what the next steps   should be after taking the antibiotic, would like a call back. ---------------------------------------------------------------------------  --------------  Daisy MENDOSA  What is the best way for the office to contact you? OK to leave message on   voicemail  Preferred Call Back Phone Number? [phone]  ---------------------------------------------------------------------------  --------------  SCRIPT ANSWERS  Relationship to Patient?  Self

## 2021-09-10 RX ORDER — PREDNISONE 10 MG/1
10 TABLET ORAL
Qty: 15 TABLET | Refills: 0 | Status: SHIPPED | OUTPATIENT
Start: 2021-09-10 | End: 2021-09-15

## 2021-09-10 RX ORDER — CEFDINIR 300 MG/1
300 CAPSULE ORAL 2 TIMES DAILY
Qty: 20 CAPSULE | Refills: 0 | Status: SHIPPED | OUTPATIENT
Start: 2021-09-10 | End: 2021-09-20

## 2021-09-18 ENCOUNTER — OFFICE VISIT (OUTPATIENT)
Dept: PRIMARY CARE CLINIC | Age: 46
End: 2021-09-18
Payer: COMMERCIAL

## 2021-09-18 VITALS
HEART RATE: 83 BPM | WEIGHT: 224 LBS | HEIGHT: 64 IN | TEMPERATURE: 98.8 F | BODY MASS INDEX: 38.24 KG/M2 | OXYGEN SATURATION: 99 %

## 2021-09-18 DIAGNOSIS — I10 ESSENTIAL HYPERTENSION: Chronic | ICD-10-CM

## 2021-09-18 DIAGNOSIS — J45.40 MODERATE PERSISTENT ASTHMA WITHOUT COMPLICATION: Primary | ICD-10-CM

## 2021-09-18 PROCEDURE — 96372 THER/PROPH/DIAG INJ SC/IM: CPT | Performed by: FAMILY MEDICINE

## 2021-09-18 PROCEDURE — 99213 OFFICE O/P EST LOW 20 MIN: CPT | Performed by: FAMILY MEDICINE

## 2021-09-18 RX ORDER — METHYLPREDNISOLONE ACETATE 40 MG/ML
40 INJECTION, SUSPENSION INTRA-ARTICULAR; INTRALESIONAL; INTRAMUSCULAR; SOFT TISSUE ONCE
Status: COMPLETED | OUTPATIENT
Start: 2021-09-18 | End: 2021-09-18

## 2021-09-18 RX ORDER — FLUTICASONE PROPIONATE AND SALMETEROL XINAFOATE 230; 21 UG/1; UG/1
2 AEROSOL, METERED RESPIRATORY (INHALATION) 2 TIMES DAILY
Qty: 1 EACH | Refills: 3 | Status: SHIPPED | OUTPATIENT
Start: 2021-09-18

## 2021-09-18 RX ORDER — ALBUTEROL SULFATE 2.5 MG/3ML
2.5 SOLUTION RESPIRATORY (INHALATION) EVERY 6 HOURS PRN
Qty: 120 EACH | Refills: 3 | Status: SHIPPED | OUTPATIENT
Start: 2021-09-18

## 2021-09-18 RX ADMIN — METHYLPREDNISOLONE ACETATE 40 MG: 40 INJECTION, SUSPENSION INTRA-ARTICULAR; INTRALESIONAL; INTRAMUSCULAR; SOFT TISSUE at 10:33

## 2021-09-18 ASSESSMENT — ENCOUNTER SYMPTOMS
ALLERGIC/IMMUNOLOGIC NEGATIVE: 1
EYES NEGATIVE: 1
GASTROINTESTINAL NEGATIVE: 1
WHEEZING: 1

## 2021-09-18 NOTE — PROGRESS NOTES
21  Name: Ector Nyhan    : 1975    Sex: female    Age: 55 y.o. Subjective:  Chief Complaint: Patient is here for asthma     Felt better with pred  But sl flare las few days  s ees  pulm dec   No cp   nocough     No  New allergens      Review of Systems   Constitutional: Negative. HENT: Negative. Eyes: Negative. Respiratory: Positive for wheezing. Cardiovascular: Negative. Gastrointestinal: Negative. Endocrine: Negative. Genitourinary: Negative. Musculoskeletal: Negative. Skin: Negative. Allergic/Immunologic: Negative. Neurological: Negative. Hematological: Negative. Psychiatric/Behavioral: Negative.           Current Outpatient Medications:     ADVAIR -21 MCG/ACT inhaler, Inhale 2 puffs into the lungs 2 times daily Rinse mouth out after each use, Disp: 1 each, Rfl: 3    albuterol (PROVENTIL) (2.5 MG/3ML) 0.083% nebulizer solution, Take 3 mLs by nebulization every 6 hours as needed for Wheezing, Disp: 120 each, Rfl: 3    cefdinir (OMNICEF) 300 MG capsule, Take 1 capsule by mouth 2 times daily for 10 days, Disp: 20 capsule, Rfl: 0    predniSONE (DELTASONE) 10 MG tablet, ONE TID FOR THREE DAYS, ONE BID FOR THREE DAYS, ONE QD FOR THREE DAYS   FOOD, Disp: 18 tablet, Rfl: 0    azelastine (ASTELIN) 0.1 % nasal spray, USE 2 SPRAYS IN EACH NOSTRIL TWICE A DAY AS DIRECTED, Disp: 1 Bottle, Rfl: 3    fluticasone (FLONASE) 50 MCG/ACT nasal spray, 2 sprays by Nasal route daily 2 sprays each nostril once daily, Disp: 3 Bottle, Rfl: 1    traZODone (DESYREL) 50 MG tablet, One q hs prn sleep----inc to  Two q hs if needed, Disp: 60 tablet, Rfl: 5    Ciclopirox 1 % SHAM, WASH EVERY OTHER DAY AND LET SIT FOR 5 MINUTES PRIOR TO RINSING, Disp: , Rfl:     MULTIPLE VITAMIN PO, Take by mouth, Disp: , Rfl:     Probiotic Product (PROBIOTIC-10 PO), Take by mouth, Disp: , Rfl:     fluticasone-salmeterol (ADVAIR HFA) 115-21 MCG/ACT inhaler, Inhale 2 puffs into the lungs 2 times daily, Disp: 1 Inhaler, Rfl: 12    albuterol sulfate  (90 Base) MCG/ACT inhaler, Inhale 2 puffs into the lungs every 6 hours as needed for Wheezing, Disp: 1 Inhaler, Rfl: 5  No Known Allergies  Social History     Socioeconomic History    Marital status: Single     Spouse name: Not on file    Number of children: Not on file    Years of education: Not on file    Highest education level: Not on file   Occupational History    Not on file   Tobacco Use    Smoking status: Former Smoker     Packs/day: 0.30     Years: 7.00     Pack years: 2.10     Types: Cigarettes     Start date:      Quit date:      Years since quittin.7    Smokeless tobacco: Never Used   Substance and Sexual Activity    Alcohol use: Yes     Comment: social    Drug use: No    Sexual activity: Not Currently     Partners: Male     Comment: tubal ligation 2008   Other Topics Concern    Not on file   Social History Narrative    New patient  with complaints of mucus in her throat    Single with 3 children. 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 Smoker quitting age 22    Seasonal allergies    Asthma    GERD    Elevated blood pressure      Referred to  ent  for throat irritation    Low back pain      referred to [facility]    Insomnia   -     Social Determinants of Health     Financial Resource Strain:     Difficulty of Paying Living Expenses:    Food Insecurity:     Worried About Running Out of Food in the Last Year:     Ran Out of Food in the Last Year:    Transportation Needs:     Lack of Transportation (Medical):      Lack of Transportation (Non-Medical):    Physical Activity:     Days of Exercise per Week:     Minutes of Exercise per Session:    Stress:     Feeling of Stress :    Social Connections:     Frequency of Communication with Friends and Family:     Frequency of Social Gatherings with Friends and Family:     Attends Mu-ism Services:     Active Member of Clubs or Organizations:     Attends Club or Organization Meetings:     Marital Status:    Intimate Partner Violence:     Fear of Current or Ex-Partner:     Emotionally Abused:     Physically Abused:     Sexually Abused:       Past Medical History:   Diagnosis Date    Abnormal Pap smear of cervix     Allergic rhinitis     seasonal    Asthma     Gastritis      Family History   Problem Relation Age of Onset    Other Mother         uterine fibroids    Hypertension Father     High Cholesterol Father       Past Surgical History:   Procedure Laterality Date    CERVIX BIOPSY       SECTION      OTHER SURGICAL HISTORY      cervical conization    TUBAL LIGATION      UPPER GASTROINTESTINAL ENDOSCOPY      due to gastritis      Vitals:    21 1023   Pulse: 83   Temp: 98.8 °F (37.1 °C)   TempSrc: Oral   SpO2: 99%   Weight: 224 lb (101.6 kg)   Height: 5' 4\" (1.626 m)       Objective:    Physical Exam  Vitals reviewed. Constitutional:       Appearance: She is well-developed. HENT:      Head: Normocephalic. Eyes:      Pupils: Pupils are equal, round, and reactive to light. Cardiovascular:      Rate and Rhythm: Normal rate and regular rhythm. Pulmonary:      Effort: Pulmonary effort is normal.      Breath sounds: Normal breath sounds. Abdominal:      Palpations: Abdomen is soft. Musculoskeletal:         General: Normal range of motion. Cervical back: Normal range of motion. Skin:     General: Skin is warm. Neurological:      Mental Status: She is alert and oriented to person, place, and time. Psychiatric:         Behavior: Behavior normal.         Juan M Coronado was seen today for asthma. Diagnoses and all orders for this visit:    Moderate persistent asthma without complication  -     ADVAIR -21 MCG/ACT inhaler;  Inhale 2 puffs into the lungs 2 times daily Rinse mouth out after each use  -     MS NEBULIZER ADMINISTRATION SET  -     DME Order for Nebulizer as OP  -     albuterol (PROVENTIL) (2.5 MG/3ML) 0.083% nebulizer solution; Take 3 mLs by nebulization every 6 hours as needed for Wheezing  -     methylPREDNISolone acetate (DEPO-MEDROL) injection 40 mg    Essential hypertension        Comments: depo   Add    albunter neb---inc    Alb   To max dose  Worse toe r  Not great few days cll here    A great deal of time spent reviewing medications, diet, exercise, social issues. Also reviewing the chart before entering the room with patient and finishing charting after leaving patient's room. More than half of that time was spent face to face with the patient in counseling and coordinating care. Follow Up: Return if symptoms worsen or fail to improve.      Seen by:  Cade Morocho, DO

## 2022-02-08 ENCOUNTER — OFFICE VISIT (OUTPATIENT)
Dept: ENT CLINIC | Age: 47
End: 2022-02-08
Payer: COMMERCIAL

## 2022-02-08 VITALS
BODY MASS INDEX: 37.56 KG/M2 | HEART RATE: 90 BPM | WEIGHT: 220 LBS | HEIGHT: 64 IN | SYSTOLIC BLOOD PRESSURE: 120 MMHG | DIASTOLIC BLOOD PRESSURE: 87 MMHG

## 2022-02-08 DIAGNOSIS — J30.9 ALLERGIC RHINITIS, UNSPECIFIED SEASONALITY, UNSPECIFIED TRIGGER: ICD-10-CM

## 2022-02-08 DIAGNOSIS — J34.2 DNS (DEVIATED NASAL SEPTUM): ICD-10-CM

## 2022-02-08 DIAGNOSIS — R09.82 POST-NASAL DRAINAGE: Primary | ICD-10-CM

## 2022-02-08 PROCEDURE — 99213 OFFICE O/P EST LOW 20 MIN: CPT | Performed by: NURSE PRACTITIONER

## 2022-02-08 RX ORDER — AZELASTINE 1 MG/ML
1-2 SPRAY, METERED NASAL 2 TIMES DAILY PRN
Qty: 90 ML | Refills: 3 | Status: SHIPPED | OUTPATIENT
Start: 2022-02-08

## 2022-02-08 RX ORDER — LEVOCETIRIZINE DIHYDROCHLORIDE 5 MG/1
5 TABLET, FILM COATED ORAL NIGHTLY
COMMUNITY

## 2022-02-08 RX ORDER — FLUTICASONE PROPIONATE 50 MCG
2 SPRAY, SUSPENSION (ML) NASAL DAILY
Qty: 48 G | Refills: 3 | Status: SHIPPED | OUTPATIENT
Start: 2022-02-08

## 2022-02-08 ASSESSMENT — ENCOUNTER SYMPTOMS
SINUS PRESSURE: 0
RESPIRATORY NEGATIVE: 1
SHORTNESS OF BREATH: 0
RHINORRHEA: 0
STRIDOR: 0
EYES NEGATIVE: 1
SINUS PAIN: 0

## 2022-02-08 NOTE — PROGRESS NOTES
[facility]  Dr. Gloria Aguilar. Bryan Rico. Ms.Ed        Patient Name:  April Srivastava  :  1975     CHIEF C/O:    Chief Complaint   Patient presents with    Sinus Problem     about the same       HISTORY OBTAINED FROM:  patient    HISTORY OF PRESENT ILLNESS:       Polina Chauhan is a 55y.o. year old female, here today for follow up of nasal congestion, allergies.     Last seen 6 months ago  Continues to use flonase and astelin daily with good results  Minimal congestion and rhinorrhea, some mild PND  Did hold medications recently due to nose bleeds  No current sinus pain or pressure  No recent fevers or antibiotics  Doing well at this time           Past Medical History:   Diagnosis Date    Abnormal Pap smear of cervix     Allergic rhinitis     seasonal    Asthma     Gastritis      Past Surgical History:   Procedure Laterality Date    CERVIX BIOPSY       SECTION      OTHER SURGICAL HISTORY      cervical conization    TUBAL LIGATION      UPPER GASTROINTESTINAL ENDOSCOPY      due to gastritis       Current Outpatient Medications:     levocetirizine (XYZAL) 5 MG tablet, Take 5 mg by mouth nightly, Disp: , Rfl:     ADVAIR -21 MCG/ACT inhaler, Inhale 2 puffs into the lungs 2 times daily Rinse mouth out after each use, Disp: 1 each, Rfl: 3    albuterol (PROVENTIL) (2.5 MG/3ML) 0.083% nebulizer solution, Take 3 mLs by nebulization every 6 hours as needed for Wheezing, Disp: 120 each, Rfl: 3    azelastine (ASTELIN) 0.1 % nasal spray, USE 2 SPRAYS IN EACH NOSTRIL TWICE A DAY AS DIRECTED, Disp: 1 Bottle, Rfl: 3    fluticasone (FLONASE) 50 MCG/ACT nasal spray, 2 sprays by Nasal route daily 2 sprays each nostril once daily, Disp: 3 Bottle, Rfl: 1    Ciclopirox 1 % SHAM, WASH EVERY OTHER DAY AND LET SIT FOR 5 MINUTES PRIOR TO RINSING, Disp: , Rfl:     MULTIPLE VITAMIN PO, Take by mouth, Disp: , Rfl:     Probiotic Product (PROBIOTIC-10 PO), Take by mouth, Disp: , Rfl:     albuterol sulfate  (90 Base) MCG/ACT inhaler, Inhale 2 puffs into the lungs every 6 hours as needed for Wheezing, Disp: 1 Inhaler, Rfl: 5    predniSONE (DELTASONE) 10 MG tablet, ONE TID FOR THREE DAYS, ONE BID FOR THREE DAYS, ONE QD FOR THREE DAYS   FOOD, Disp: 18 tablet, Rfl: 0    traZODone (DESYREL) 50 MG tablet, One q hs prn sleep----inc to  Two q hs if needed (Patient not taking: Reported on 2022), Disp: 60 tablet, Rfl: 5    fluticasone-salmeterol (ADVAIR HFA) 115-21 MCG/ACT inhaler, Inhale 2 puffs into the lungs 2 times daily, Disp: 1 Inhaler, Rfl: 12  Patient has no known allergies. Social History     Tobacco Use    Smoking status: Former Smoker     Packs/day: 0.30     Years: 7.00     Pack years: 2.10     Types: Cigarettes     Start date:      Quit date:      Years since quittin.1    Smokeless tobacco: Never Used   Substance Use Topics    Alcohol use: Yes     Comment: social    Drug use: No     Family History   Problem Relation Age of Onset    Other Mother         uterine fibroids    Hypertension Father     High Cholesterol Father        Review of Systems   Constitutional: Negative. Negative for activity change and appetite change. HENT: Positive for postnasal drip. Negative for congestion, rhinorrhea, sinus pressure and sinus pain. Eyes: Negative. Respiratory: Negative. Negative for shortness of breath and stridor. Cardiovascular: Negative. Negative for chest pain and palpitations. Endocrine: Negative. Musculoskeletal: Negative. Skin: Negative. Neurological: Negative. Negative for dizziness. Hematological: Negative. Psychiatric/Behavioral: Negative. /87 (Site: Left Upper Arm, Position: Sitting, Cuff Size: Large Adult)   Pulse 90   Ht 5' 4\" (1.626 m)   Wt 220 lb (99.8 kg)   LMP 2022 (Exact Date)   Breastfeeding No   BMI 37.76 kg/m²   Physical Exam  Constitutional:       Appearance: Normal appearance.    HENT:      Head: Normocephalic. Right Ear: Tympanic membrane, ear canal and external ear normal.      Left Ear: Tympanic membrane, ear canal and external ear normal.      Nose: Nose normal. No rhinorrhea. Right Turbinates: Pale. Not swollen. Left Turbinates: Pale. Not swollen. Mouth/Throat:      Lips: Pink. Mouth: Mucous membranes are moist.     Eyes:      Conjunctiva/sclera: Conjunctivae normal.      Pupils: Pupils are equal, round, and reactive to light. Cardiovascular:      Rate and Rhythm: Normal rate and regular rhythm. Pulses: Normal pulses. Pulmonary:      Effort: Pulmonary effort is normal. No respiratory distress. Breath sounds: No stridor. Musculoskeletal:         General: Normal range of motion. Cervical back: Normal range of motion. No rigidity. No muscular tenderness. Skin:     General: Skin is warm and dry. Neurological:      General: No focal deficit present. Mental Status: She is alert and oriented to person, place, and time. Psychiatric:         Mood and Affect: Mood normal.         Behavior: Behavior normal.         Thought Content: Thought content normal.         Judgment: Judgment normal.         IMPRESSION/PLAN:    Tara Brown was seen today for sinus problem. Diagnoses and all orders for this visit:    Post-nasal drainage    Allergic rhinitis, unspecified seasonality, unspecified trigger  -     azelastine (ASTELIN) 0.1 % nasal spray; 1-2 sprays by Nasal route 2 times daily as needed for Rhinitis Use in each nostril as directed    DNS (deviated nasal septum)    Other orders  -     fluticasone (FLONASE) 50 MCG/ACT nasal spray; 2 sprays by Nasal route daily 2 sprays each nostril once daily      Patient will continue with Flonase, 2 sprays each nostril once daily as well as Astelin spray, 1 to 2 sprays each nostril twice daily as needed. Patient's symptoms have been stable at this time she will follow up in 1 year for refills.   She is instructed to call with any new or worsening symptoms prior to her next appointment.         ROBERT Jhaveri, FNP-C  [facility], N[facility] Th[facility]    The information contained in this note has been dictated using drug and medical speech recognition software and may contain errors

## 2022-04-19 ENCOUNTER — OFFICE VISIT (OUTPATIENT)
Dept: PRIMARY CARE CLINIC | Age: 47
End: 2022-04-19
Payer: COMMERCIAL

## 2022-04-19 ENCOUNTER — HOSPITAL ENCOUNTER (EMERGENCY)
Age: 47
Discharge: HOME | End: 2022-04-19
Payer: COMMERCIAL

## 2022-04-19 ENCOUNTER — APPOINTMENT (OUTPATIENT)
Dept: CT IMAGING | Age: 47
End: 2022-04-19
Payer: COMMERCIAL

## 2022-04-19 VITALS
WEIGHT: 232 LBS | DIASTOLIC BLOOD PRESSURE: 92 MMHG | HEART RATE: 68 BPM | SYSTOLIC BLOOD PRESSURE: 143 MMHG | RESPIRATION RATE: 18 BRPM | HEIGHT: 64 IN | OXYGEN SATURATION: 100 % | BODY MASS INDEX: 39.61 KG/M2 | TEMPERATURE: 97.8 F

## 2022-04-19 VITALS
TEMPERATURE: 96.5 F | BODY MASS INDEX: 39.82 KG/M2 | DIASTOLIC BLOOD PRESSURE: 83 MMHG | WEIGHT: 232 LBS | SYSTOLIC BLOOD PRESSURE: 138 MMHG

## 2022-04-19 DIAGNOSIS — M54.50 ACUTE RIGHT-SIDED LOW BACK PAIN WITHOUT SCIATICA: ICD-10-CM

## 2022-04-19 DIAGNOSIS — R10.31 RIGHT LOWER QUADRANT ABDOMINAL PAIN: Primary | ICD-10-CM

## 2022-04-19 DIAGNOSIS — R10.31 RIGHT LOWER QUADRANT ABDOMINAL PAIN: ICD-10-CM

## 2022-04-19 DIAGNOSIS — R10.9 RIGHT FLANK PAIN: ICD-10-CM

## 2022-04-19 DIAGNOSIS — D25.9 UTERINE LEIOMYOMA, UNSPECIFIED LOCATION: Primary | ICD-10-CM

## 2022-04-19 LAB
ALBUMIN SERPL-MCNC: 4.5 G/DL (ref 3.5–5.2)
ALP BLD-CCNC: 85 U/L (ref 35–104)
ALT SERPL-CCNC: 16 U/L (ref 0–32)
ANION GAP SERPL CALCULATED.3IONS-SCNC: 10 MMOL/L (ref 7–16)
AST SERPL-CCNC: 36 U/L (ref 0–31)
BACTERIA: NORMAL /HPF
BILIRUB SERPL-MCNC: 0.3 MG/DL (ref 0–1.2)
BILIRUBIN URINE: NEGATIVE
BILIRUBIN, POC: NORMAL
BLOOD URINE, POC: NORMAL
BLOOD, URINE: NORMAL
BUN BLDV-MCNC: 10 MG/DL (ref 6–20)
CALCIUM SERPL-MCNC: 9.2 MG/DL (ref 8.6–10.2)
CHLORIDE BLD-SCNC: 104 MMOL/L (ref 98–107)
CLARITY, POC: NORMAL
CLARITY: CLEAR
CO2: 26 MMOL/L (ref 22–29)
COLOR, POC: YELLOW
COLOR: YELLOW
CREAT SERPL-MCNC: 0.8 MG/DL (ref 0.5–1)
EPITHELIAL CELLS, UA: NORMAL /HPF
GFR AFRICAN AMERICAN: >60
GFR NON-AFRICAN AMERICAN: >60 ML/MIN/1.73
GLUCOSE BLD-MCNC: 85 MG/DL (ref 74–99)
GLUCOSE URINE, POC: NORMAL
GLUCOSE URINE: NEGATIVE MG/DL
HCG(URINE) PREGNANCY TEST: NEGATIVE
KETONES, POC: NORMAL
KETONES, URINE: NEGATIVE MG/DL
LACTIC ACID: 0.9 MMOL/L (ref 0.5–2.2)
LEUKOCYTE EST, POC: NORMAL
LEUKOCYTE ESTERASE, URINE: NEGATIVE
LIPASE: 54 U/L (ref 13–60)
NITRITE, POC: NORMAL
NITRITE, URINE: NEGATIVE
PH UA: 5.5 (ref 5–9)
PH, POC: 7
POTASSIUM REFLEX MAGNESIUM: 3.8 MMOL/L (ref 3.5–5)
PROTEIN UA: NEGATIVE MG/DL
PROTEIN, POC: NORMAL
RBC UA: NORMAL /HPF (ref 0–2)
REASON FOR REJECTION: NORMAL
REJECTED TEST: NORMAL
SODIUM BLD-SCNC: 140 MMOL/L (ref 132–146)
SPECIFIC GRAVITY UA: 1.02 (ref 1–1.03)
SPECIFIC GRAVITY, POC: 1.02
TOTAL PROTEIN: 7.8 G/DL (ref 6.4–8.3)
UROBILINOGEN, POC: 0.2
UROBILINOGEN, URINE: 0.2 E.U./DL
WBC UA: NORMAL /HPF (ref 0–5)

## 2022-04-19 PROCEDURE — 83690 ASSAY OF LIPASE: CPT

## 2022-04-19 PROCEDURE — 99213 OFFICE O/P EST LOW 20 MIN: CPT | Performed by: FAMILY MEDICINE

## 2022-04-19 PROCEDURE — 80053 COMPREHEN METABOLIC PANEL: CPT

## 2022-04-19 PROCEDURE — 2580000003 HC RX 258: Performed by: NURSE PRACTITIONER

## 2022-04-19 PROCEDURE — 83605 ASSAY OF LACTIC ACID: CPT

## 2022-04-19 PROCEDURE — 81025 URINE PREGNANCY TEST: CPT

## 2022-04-19 PROCEDURE — 36415 COLL VENOUS BLD VENIPUNCTURE: CPT

## 2022-04-19 PROCEDURE — 74176 CT ABD & PELVIS W/O CONTRAST: CPT

## 2022-04-19 PROCEDURE — 99284 EMERGENCY DEPT VISIT MOD MDM: CPT

## 2022-04-19 PROCEDURE — 81002 URINALYSIS NONAUTO W/O SCOPE: CPT | Performed by: FAMILY MEDICINE

## 2022-04-19 PROCEDURE — 87088 URINE BACTERIA CULTURE: CPT

## 2022-04-19 PROCEDURE — 81001 URINALYSIS AUTO W/SCOPE: CPT

## 2022-04-19 RX ORDER — 0.9 % SODIUM CHLORIDE 0.9 %
1000 INTRAVENOUS SOLUTION INTRAVENOUS ONCE
Status: COMPLETED | OUTPATIENT
Start: 2022-04-19 | End: 2022-04-19

## 2022-04-19 RX ORDER — IBUPROFEN 600 MG/1
600 TABLET ORAL 3 TIMES DAILY PRN
Qty: 30 TABLET | Refills: 0 | Status: SHIPPED | OUTPATIENT
Start: 2022-04-19

## 2022-04-19 RX ADMIN — SODIUM CHLORIDE 1000 ML: 9 INJECTION, SOLUTION INTRAVENOUS at 16:02

## 2022-04-19 ASSESSMENT — PATIENT HEALTH QUESTIONNAIRE - PHQ9
SUM OF ALL RESPONSES TO PHQ9 QUESTIONS 1 & 2: 0
2. FEELING DOWN, DEPRESSED OR HOPELESS: 0
SUM OF ALL RESPONSES TO PHQ QUESTIONS 1-9: 0
SUM OF ALL RESPONSES TO PHQ QUESTIONS 1-9: 0
1. LITTLE INTEREST OR PLEASURE IN DOING THINGS: 0
SUM OF ALL RESPONSES TO PHQ QUESTIONS 1-9: 0
SUM OF ALL RESPONSES TO PHQ QUESTIONS 1-9: 0

## 2022-04-19 ASSESSMENT — PAIN DESCRIPTION - DESCRIPTORS: DESCRIPTORS: BURNING;ACHING

## 2022-04-19 ASSESSMENT — PAIN DESCRIPTION - ORIENTATION: ORIENTATION: RIGHT

## 2022-04-19 ASSESSMENT — PAIN DESCRIPTION - PROGRESSION: CLINICAL_PROGRESSION: NOT CHANGED

## 2022-04-19 ASSESSMENT — PAIN SCALES - GENERAL: PAINLEVEL_OUTOF10: 3

## 2022-04-19 ASSESSMENT — ENCOUNTER SYMPTOMS
RESPIRATORY NEGATIVE: 1
ALLERGIC/IMMUNOLOGIC NEGATIVE: 1
EYES NEGATIVE: 1
ABDOMINAL PAIN: 1

## 2022-04-19 ASSESSMENT — PAIN DESCRIPTION - PAIN TYPE: TYPE: ACUTE PAIN

## 2022-04-19 ASSESSMENT — PAIN DESCRIPTION - ONSET: ONSET: SUDDEN

## 2022-04-19 ASSESSMENT — PAIN DESCRIPTION - LOCATION: LOCATION: FLANK

## 2022-04-19 ASSESSMENT — PAIN DESCRIPTION - FREQUENCY: FREQUENCY: CONTINUOUS

## 2022-04-19 NOTE — PROGRESS NOTES
22  Name: Siobhan Florez    : 1975    Sex: female    Age: 55 y.o. Subjective:  Chief Complaint: Patient is here for  rlq abd pain     apain right low back for a week and  Right lower abd pain for a week      Review of Systems   Constitutional: Negative. HENT: Negative. Eyes: Negative. Respiratory: Negative. Cardiovascular: Negative. Gastrointestinal: Positive for abdominal pain. Endocrine: Negative. Genitourinary: Negative. Musculoskeletal: Negative. Skin: Negative. Allergic/Immunologic: Negative. Neurological: Negative. Hematological: Negative. Psychiatric/Behavioral: Negative.           Current Outpatient Medications:     levocetirizine (XYZAL) 5 MG tablet, Take 5 mg by mouth nightly, Disp: , Rfl:     azelastine (ASTELIN) 0.1 % nasal spray, 1-2 sprays by Nasal route 2 times daily as needed for Rhinitis Use in each nostril as directed, Disp: 90 mL, Rfl: 3    fluticasone (FLONASE) 50 MCG/ACT nasal spray, 2 sprays by Nasal route daily 2 sprays each nostril once daily, Disp: 48 g, Rfl: 3    ADVAIR -21 MCG/ACT inhaler, Inhale 2 puffs into the lungs 2 times daily Rinse mouth out after each use, Disp: 1 each, Rfl: 3    albuterol (PROVENTIL) (2.5 MG/3ML) 0.083% nebulizer solution, Take 3 mLs by nebulization every 6 hours as needed for Wheezing, Disp: 120 each, Rfl: 3    predniSONE (DELTASONE) 10 MG tablet, ONE TID FOR THREE DAYS, ONE BID FOR THREE DAYS, ONE QD FOR THREE DAYS   FOOD, Disp: 18 tablet, Rfl: 0    traZODone (DESYREL) 50 MG tablet, One q hs prn sleep----inc to  Two q hs if needed (Patient not taking: Reported on 2022), Disp: 60 tablet, Rfl: 5    Ciclopirox 1 % SHAM, WASH EVERY OTHER DAY AND LET SIT FOR 5 MINUTES PRIOR TO RINSING, Disp: , Rfl:     MULTIPLE VITAMIN PO, Take by mouth, Disp: , Rfl:     Probiotic Product (PROBIOTIC-10 PO), Take by mouth, Disp: , Rfl:     fluticasone-salmeterol (ADVAIR HFA) 115-21 MCG/ACT inhaler, Inhale 2 puffs into the lungs 2 times daily, Disp: 1 Inhaler, Rfl: 12    albuterol sulfate  (90 Base) MCG/ACT inhaler, Inhale 2 puffs into the lungs every 6 hours as needed for Wheezing, Disp: 1 Inhaler, Rfl: 5  No Known Allergies  Social History     Socioeconomic History    Marital status: Single     Spouse name: Not on file    Number of children: Not on file    Years of education: Not on file    Highest education level: Not on file   Occupational History    Not on file   Tobacco Use    Smoking status: Former Smoker     Packs/day: 0.30     Years: 7.00     Pack years: 2.10     Types: Cigarettes     Start date:      Quit date:      Years since quittin.3    Smokeless tobacco: Never Used   Substance and Sexual Activity    Alcohol use: Yes     Comment: social    Drug use: No    Sexual activity: Not Currently     Partners: Male     Comment: tubal ligation 2008   Other Topics Concern    Not on file   Social History Narrative    New patient  with complaints of mucus in her throat    Single with 3 children. 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 Smoker quitting age 22    Seasonal allergies    Asthma    GERD    Elevated blood pressure      Referred to  ent  for throat irritation    Low back pain      referred to [facility]    Insomnia   -    Rlq abd pain       sent to er     Social Determinants of Health     Financial Resource Strain:     Difficulty of Paying Living Expenses: Not on file   Food Insecurity:     Worried About Running Out of Food in the Last Year: Not on file    Dm of Food in the Last Year: Not on file   Transportation Needs:     Lack of Transportation (Medical): Not on file    Lack of Transportation (Non-Medical):  Not on file   Physical Activity:     Days of Exercise per Week: Not on file    Minutes of Exercise per Session: Not on file   Stress:     Feeling of Stress : Not on file   Social Connections:     Frequency of Communication with Friends and Family: Not on file    Frequency of Social Gatherings with Friends and Family: Not on file    Attends Restorationist Services: Not on file    Active Member of Clubs or Organizations: Not on file    Attends Club or Organization Meetings: Not on file    Marital Status: Not on file   Intimate Partner Violence:     Fear of Current or Ex-Partner: Not on file    Emotionally Abused: Not on file    Physically Abused: Not on file    Sexually Abused: Not on file   Housing Stability:     Unable to Pay for Housing in the Last Year: Not on file    Number of [place] in the Last Year: Not on file    Unstable Housing in the Last Year: Not on file      Past Medical History:   Diagnosis Date    Abnormal Pap smear of cervix     Allergic rhinitis     seasonal    Asthma     Gastritis      Family History   Problem Relation Age of Onset    Other Mother         uterine fibroids    Hypertension Father     High Cholesterol Father       Past Surgical History:   Procedure Laterality Date    CERVIX BIOPSY       SECTION      OTHER SURGICAL HISTORY      cervical conization    TUBAL LIGATION      UPPER GASTROINTESTINAL ENDOSCOPY      due to gastritis      Vitals:    22 1320   BP: 138/83   Temp: 96.5 °F (35.8 °C)   TempSrc: Infrared   Weight: 232 lb (105.2 kg)       Objective:    Physical Exam  Vitals reviewed. Constitutional:       Appearance: She is well-developed. HENT:      Head: Normocephalic. Eyes:      Pupils: Pupils are equal, round, and reactive to light. Cardiovascular:      Rate and Rhythm: Normal rate and regular rhythm. Pulmonary:      Effort: Pulmonary effort is normal.      Breath sounds: Normal breath sounds. Abdominal:      Palpations: Abdomen is soft. Tenderness: There is abdominal tenderness (rlq tender with palp). There is rebound. Musculoskeletal:         General: Normal range of motion. Cervical back: Normal range of motion. Skin:     General: Skin is warm. Neurological:      Mental Status: She is alert and oriented to person, place, and time. Psychiatric:         Behavior: Behavior normal.         Ramin Lunsford was seen today for back pain and abdominal pain. Diagnoses and all orders for this visit:    Right lower quadrant abdominal pain  -     POCT Urinalysis no Micro  -     Culture, Urine; Future    Acute right-sided low back pain without sciatica  -     POCT Urinalysis no Micro  -     Culture, Urine; Future        Comments:  ua with blood   To er now   Poss kidney stone or appy    I educated the patient about all medications. Make sure they were correct and educated  on the risk associated with missing meds or taking them incorrectly. A list of medications is being sent home with patient today. A great deal of time spent reviewing medications, diet, exercise, social issues. Also reviewing the chart before entering the room with patient and finishing charting after leaving patient's room. More than half of that time was spent face to face with the patient in counseling and coordinating care. ,Check blood pressure at home twice a day. Low-salt low caffeine diet. Call if systolic blood pressure is above 070 and diastolic blood pressures above 85. Only use a upper arm digital cuff. I informed patient about the risk associated with noncompliance of medication and taking medications incorrectly. Appropriate follow-up with myself and all specialist.  Encourage family members to take active role in assisting with medications and medical care. If any confusion should develop to notify my office immediately to avoid risk of worsening medical condition      Follow Up: Return to er now, for Reg Appt.      Seen by:  Elvira Metz DO

## 2022-04-19 NOTE — Clinical Note
Jose Gannon was seen and treated in our emergency department on 4/19/2022. She may return to work on 04/20/2022. If you have any questions or concerns, please don't hesitate to call.       HEATHER Mir - CNP

## 2022-04-19 NOTE — ED PROVIDER NOTES
[facility]  Department of Emergency Medicine   ED  Encounter Note  Admit Date/RoomTime: 2022  3:11 PM  ED Room:     NAME: Diana Prieto  : 1975  MRN: [id number]     Chief Complaint:  Flank Pain (pain in back started a week ago, last night started in lower right quad. )    History of Present Illness        Diana Prieto is a 55 y.o. old female who presents to the emergency department by private vehicle, for intermittent episodes aching, cramping pain in the lower abdomen and in the right flank without radiation which began several day(s) prior to arrival.  There has been no similar episodes in the past .   Since onset the symptoms have been waxing and waning. The pain is associated with no additional symptoms. The pain is aggravated by nothing in particular and relieved by nothing. There has been NO additional complaints. Patient states that she did see her primary care physician and was sent to the ER for evaluation and a CAT scan. Patient states that she still has her appendix she denies any fever chills nausea vomiting or diarrhea. .  ROS   Pertinent positives and negatives are stated within HPI, all other systems reviewed and are negative. Past Medical History:  has a past medical history of Abnormal Pap smear of cervix, Allergic rhinitis, Asthma, and Gastritis. Surgical History:  has a past surgical history that includes  section; Tubal ligation; Upper gastrointestinal endoscopy; other surgical history; and Cervix biopsy. Social History:  reports that she quit smoking about 24 years ago. Her smoking use included cigarettes. She started smoking about 31 years ago. She has a 2.10 pack-year smoking history. She has never used smokeless tobacco. She reports current alcohol use. She reports that she does not use drugs. Family History: family history includes High Cholesterol in her father; Hypertension in her father;  Other in her mother. Allergies: Patient has no known allergies. Physical Exam   Oxygen Saturation Interpretation: Normal.        ED Triage Vitals   BP Temp Temp Source Pulse Resp SpO2 Height Weight   04/19/22 1457 04/19/22 1457 04/19/22 1457 04/19/22 1457 04/19/22 1457 04/19/22 1457 04/19/22 1614 04/19/22 1614   136/83 97.8 °F (36.6 °C) Temporal 61 16 99 % 5' 4\" (1.626 m) 232 lb (105.2 kg)       Physical Exam  General Appearance/Constitutional:  Alert, development consistent with age. HEENT:  NC/NT. PERRLA. Airway patent. Neck:  Supple. No lymphadenopathy. Respiratory: Lungs Clear to auscultation and breath sounds equal.  CV:  Regular rate and rhythm. GI:  normal appearing, non-distended with no visible hernias. Bowel sounds: normal bowel sounds. Tenderness: There is mild tenderness present - located in the right flank., There is no rebound tenderness. , There is no guarding. , There is no distension. , There is no pulsatile mass. .           Liver: non-tender and non-palpable. Spleen:  non-tender and non-palpable. Back: CVA Tenderness: No CVA tenderness. Integument:  Normal turgor. Warm, dry, without visible rash, unless noted elsewhere. Neurological:  Orientation age-appropriate. Motor functions intact.     Lab / Imaging Results   (All laboratory and radiology results have been personally reviewed by myself)  Labs:  Results for orders placed or performed during the hospital encounter of 04/19/22   Urinalysis with Microscopic   Result Value Ref Range    Color, UA Yellow Straw/Yellow    Clarity, UA Clear Clear    Glucose, Ur Negative Negative mg/dL    Bilirubin Urine Negative Negative    Ketones, Urine Negative Negative mg/dL    Specific Gravity, UA 1.020 1.005 - 1.030    Blood, Urine TRACE-INTACT Negative    pH, UA 5.5 5.0 - 9.0    Protein, UA Negative Negative mg/dL    Urobilinogen, Urine 0.2 <2.0 E.U./dL    Nitrite, Urine Negative Negative    Leukocyte management. They have remained hemodynamically stable throughout their entire ED visit and are stable for discharge with outpatient follow-up. The plan has been discussed in detail and they are aware of the specific conditions for emergent return, as well as the importance of follow-up. Patient at this time is nontoxic in appearance and in no distress. Patient is negative for appendicitis negative for acute pyelonephritis also negative for kidney stone. Patient at this time is agreeable for outpatient follow-up with her OB/GYN she states that she does know that she has a fibroid however we will follow-up with her primary care physician and OB/GYN. Patient states that she knows that she does need a hysterectomy however she does not want it yet but the painful episodes have been increasing. Patient also declining a CBC. Nursing staff was having a hard time obtaining a CBC and it hemolyzed in the lab. Patient was attempted multiple times for a CBC however unable to obtain it. Patient states that she does not want stuck again and does not want the CBC drawn she will just follow-up with her family doctor. Plan of Care/Counseling:  HEATHER Montoya CNP  reviewed today's visit with the patient in addition to providing specific details for the plan of care and counseling regarding the diagnosis and prognosis. Questions are answered at this time and are agreeable with the plan. Assessment      1. Uterine leiomyoma, unspecified location    2. Right flank pain      This patient's ED course included: a personal history and physicial examination  This patient has remained hemodynamically stable during their ED course. Plan   Discharged home  Patient condition is good.     New Medications     Discharge Medication List as of 4/19/2022  7:22 PM      START taking these medications    Details   ibuprofen (ADVIL;MOTRIN) 600 MG tablet Take 1 tablet by mouth 3 times daily as needed for Pain, Disp-30 tablet, R-0Normal           Electronically signed by HEATHER Marshall CNP   DD: 4/19/22  **This report was transcribed using voice recognition software. Every effort was made to ensure accuracy; however, inadvertent computerized transcription errors may be present.   END OF PROVIDER NOTE     HEATHER Gamez CNP  04/19/22 2003

## 2022-04-21 LAB
URINE CULTURE, ROUTINE: NORMAL
URINE CULTURE, ROUTINE: NORMAL

## 2022-05-05 ENCOUNTER — OFFICE VISIT (OUTPATIENT)
Dept: PRIMARY CARE CLINIC | Age: 47
End: 2022-05-05
Payer: COMMERCIAL

## 2022-05-05 VITALS
SYSTOLIC BLOOD PRESSURE: 134 MMHG | WEIGHT: 230 LBS | TEMPERATURE: 96.2 F | HEIGHT: 64 IN | BODY MASS INDEX: 39.27 KG/M2 | DIASTOLIC BLOOD PRESSURE: 82 MMHG

## 2022-05-05 DIAGNOSIS — Z00.01 ENCOUNTER FOR ANNUAL GENERAL MEDICAL EXAMINATION WITH ABNORMAL FINDINGS IN ADULT: Primary | ICD-10-CM

## 2022-05-05 PROCEDURE — 99396 PREV VISIT EST AGE 40-64: CPT | Performed by: FAMILY MEDICINE

## 2022-05-05 ASSESSMENT — ENCOUNTER SYMPTOMS
GASTROINTESTINAL NEGATIVE: 1
EYES NEGATIVE: 1
RESPIRATORY NEGATIVE: 1
ALLERGIC/IMMUNOLOGIC NEGATIVE: 1

## 2022-05-05 NOTE — PROGRESS NOTES
22  Name: Luis Lora    : 1975    Sex: female    Age: 55 y.o. Subjective:  Chief Complaint: Patient is here for  pe fro foster    paretning     Gi  Sx  resovled  Went toer  Ct wit  marco antonioorid  She oscar be seeign gyn son for same    Feels great  rosneg      Review of Systems   Constitutional: Negative. HENT: Negative. Eyes: Negative. Respiratory: Negative. Cardiovascular: Negative. Gastrointestinal: Negative. Endocrine: Negative. Genitourinary: Negative. Musculoskeletal: Negative. Skin: Negative. Allergic/Immunologic: Negative. Neurological: Negative. Hematological: Negative. Psychiatric/Behavioral: Negative.           Current Outpatient Medications:     ibuprofen (ADVIL;MOTRIN) 600 MG tablet, Take 1 tablet by mouth 3 times daily as needed for Pain, Disp: 30 tablet, Rfl: 0    levocetirizine (XYZAL) 5 MG tablet, Take 5 mg by mouth nightly, Disp: , Rfl:     azelastine (ASTELIN) 0.1 % nasal spray, 1-2 sprays by Nasal route 2 times daily as needed for Rhinitis Use in each nostril as directed, Disp: 90 mL, Rfl: 3    fluticasone (FLONASE) 50 MCG/ACT nasal spray, 2 sprays by Nasal route daily 2 sprays each nostril once daily, Disp: 48 g, Rfl: 3    ADVAIR -21 MCG/ACT inhaler, Inhale 2 puffs into the lungs 2 times daily Rinse mouth out after each use, Disp: 1 each, Rfl: 3    albuterol (PROVENTIL) (2.5 MG/3ML) 0.083% nebulizer solution, Take 3 mLs by nebulization every 6 hours as needed for Wheezing, Disp: 120 each, Rfl: 3    traZODone (DESYREL) 50 MG tablet, One q hs prn sleep----inc to  Two q hs if needed, Disp: 60 tablet, Rfl: 5    Ciclopirox 1 % SHAM, WASH EVERY OTHER DAY AND LET SIT FOR 5 MINUTES PRIOR TO RINSING, Disp: , Rfl:     MULTIPLE VITAMIN PO, Take by mouth, Disp: , Rfl:     Probiotic Product (PROBIOTIC-10 PO), Take by mouth, Disp: , Rfl:     fluticasone-salmeterol (ADVAIR HFA) 115-21 MCG/ACT inhaler, Inhale 2 puffs into the lungs 2 times daily, Disp: 1 Inhaler, Rfl: 12    albuterol sulfate  (90 Base) MCG/ACT inhaler, Inhale 2 puffs into the lungs every 6 hours as needed for Wheezing, Disp: 1 Inhaler, Rfl: 5  No Known Allergies  Social History     Socioeconomic History    Marital status: Single     Spouse name: Not on file    Number of children: Not on file    Years of education: Not on file    Highest education level: Not on file   Occupational History    Not on file   Tobacco Use    Smoking status: Former Smoker     Packs/day: 0.30     Years: 7.00     Pack years: 2.10     Types: Cigarettes     Start date:      Quit date:      Years since quittin.3    Smokeless tobacco: Never Used   Substance and Sexual Activity    Alcohol use: Yes     Comment: social    Drug use: No    Sexual activity: Not Currently     Partners: Male     Comment: tubal ligation 2008   Other Topics Concern    Not on file   Social History Narrative    New patient  with complaints of mucus in her throat    Single with 3 children. 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 Smoker quitting age 22    Seasonal allergies    Asthma    GERD    Elevated blood pressure      Referred to  ent  for throat irritation    Low back pain      referred to [facility]    Insomnia       Rlq abd pain       sent to er--ct with uterine fobroid     Social Determinants of Health     Financial Resource Strain:     Difficulty of Paying Living Expenses: Not on file   Food Insecurity:     Worried About Running Out of Food in the Last Year: Not on file    Dm of Food in the Last Year: Not on file   Transportation Needs:     Lack of Transportation (Medical): Not on file    Lack of Transportation (Non-Medical):  Not on file   Physical Activity:     Days of Exercise per Week: Not on file    Minutes of Exercise per Session: Not on file   Stress:     Feeling of Stress : Not on file   Social Connections:     Frequency of Communication with Friends and Family: Not on file    Frequency of Social Gatherings with Friends and Family: Not on file    Attends [employer]ish Services: Not on file    Active Member of Clubs or Organizations: Not on file    Attends Club or Organization Meetings: Not on file    Marital Status: Not on file   Intimate Partner Violence:     Fear of Current or Ex-Partner: Not on file    Emotionally Abused: Not on file    Physically Abused: Not on file    Sexually Abused: Not on file   Housing Stability:     Unable to Pay for Housing in the Last Year: Not on file    Number of [place] in the Last Year: Not on file    Unstable Housing in the Last Year: Not on file      Past Medical History:   Diagnosis Date    Abnormal Pap smear of cervix     Allergic rhinitis     seasonal    Asthma     Gastritis      Family History   Problem Relation Age of Onset    Other Mother         uterine fibroids    Hypertension Father     High Cholesterol Father       Past Surgical History:   Procedure Laterality Date    CERVIX BIOPSY       SECTION      OTHER SURGICAL HISTORY      cervical conization    TUBAL LIGATION      UPPER GASTROINTESTINAL ENDOSCOPY      due to gastritis      Vitals:    22 0835   BP: 134/82   Temp: 96.2 °F (35.7 °C)   TempSrc: Infrared   Weight: 230 lb (104.3 kg)   Height: 5' 4\" (1.626 m)       Objective:    Physical Exam  Vitals reviewed. Constitutional:       Appearance: She is well-developed. HENT:      Head: Normocephalic. Eyes:      Pupils: Pupils are equal, round, and reactive to light. Cardiovascular:      Rate and Rhythm: Normal rate and regular rhythm. Pulmonary:      Effort: Pulmonary effort is normal.      Breath sounds: Normal breath sounds. Abdominal:      Palpations: Abdomen is soft. Musculoskeletal:         General: Normal range of motion. Cervical back: Normal range of motion. Skin:     General: Skin is warm. Neurological:      Mental Status: She is alert and oriented to person, place, and time. Psychiatric:         Behavior: Behavior normal.         Malen Cabot was seen today for annual exam.    Diagnoses and all orders for this visit:    Encounter for annual general medical examination with abnormal findings in adult        Comments:  Form filel dout for   Missy Frazier  Hm  siues lwo  sugaar  Carb diet            I educated the patient about all medications. Make sure they were correct and educated  on the risk associated with missing meds or taking them incorrectly. A list of medications is being sent home with patient today. I informed patient about the risk associated with noncompliance of medication and taking medications incorrectly. Appropriate follow-up with myself and all specialist.  Encourage family members to take active role in assisting with medications and medical care. If any confusion should develop to notify my office immediately to avoid risk of worsening medical condition          A great deal of time spent reviewing medications, diet, exercise, social issues. Also reviewing the chart before entering the room with patient and finishing charting after leaving patient's room. More than half of that time was spent face to face with the patient in counseling and coordinating care. Follow Up: No follow-ups on file.      Seen by:  Georgia Bajwa DO

## 2023-08-07 ENCOUNTER — OFFICE VISIT (OUTPATIENT)
Dept: PRIMARY CARE CLINIC | Age: 48
End: 2023-08-07
Payer: COMMERCIAL

## 2023-08-07 DIAGNOSIS — E55.9 VITAMIN D DEFICIENCY: ICD-10-CM

## 2023-08-07 DIAGNOSIS — Z00.01 ENCOUNTER FOR ANNUAL GENERAL MEDICAL EXAMINATION WITH ABNORMAL FINDINGS IN ADULT: Primary | ICD-10-CM

## 2023-08-07 DIAGNOSIS — M62.838 MUSCLE SPASM: ICD-10-CM

## 2023-08-07 DIAGNOSIS — I10 ESSENTIAL HYPERTENSION: Chronic | ICD-10-CM

## 2023-08-07 DIAGNOSIS — M54.50 ACUTE BILATERAL LOW BACK PAIN WITHOUT SCIATICA: ICD-10-CM

## 2023-08-07 DIAGNOSIS — E03.9 ACQUIRED HYPOTHYROIDISM: ICD-10-CM

## 2023-08-07 DIAGNOSIS — N20.0 KIDNEY STONES: ICD-10-CM

## 2023-08-07 LAB
BILIRUBIN, POC: NEGATIVE
BLOOD URINE, POC: NEGATIVE
CLARITY, POC: CLEAR
COLOR, POC: YELLOW
GLUCOSE URINE, POC: NEGATIVE
KETONES, POC: NEGATIVE
LEUKOCYTE EST, POC: NEGATIVE
NITRITE, POC: NEGATIVE
PH, POC: 7
PROTEIN, POC: NEGATIVE
SPECIFIC GRAVITY, POC: 1.01
UROBILINOGEN, POC: 1

## 2023-08-07 PROCEDURE — 3078F DIAST BP <80 MM HG: CPT | Performed by: FAMILY MEDICINE

## 2023-08-07 PROCEDURE — 99396 PREV VISIT EST AGE 40-64: CPT | Performed by: FAMILY MEDICINE

## 2023-08-07 PROCEDURE — 81002 URINALYSIS NONAUTO W/O SCOPE: CPT | Performed by: FAMILY MEDICINE

## 2023-08-07 PROCEDURE — 3074F SYST BP LT 130 MM HG: CPT | Performed by: FAMILY MEDICINE

## 2023-08-07 RX ORDER — ETODOLAC 400 MG/1
400 TABLET, FILM COATED ORAL 2 TIMES DAILY
Qty: 28 TABLET | Refills: 0 | Status: SHIPPED | OUTPATIENT
Start: 2023-08-07

## 2023-08-07 SDOH — ECONOMIC STABILITY: HOUSING INSECURITY
IN THE LAST 12 MONTHS, WAS THERE A TIME WHEN YOU DID NOT HAVE A STEADY PLACE TO SLEEP OR SLEPT IN A SHELTER (INCLUDING NOW)?: NO

## 2023-08-07 SDOH — ECONOMIC STABILITY: FOOD INSECURITY: WITHIN THE PAST 12 MONTHS, YOU WORRIED THAT YOUR FOOD WOULD RUN OUT BEFORE YOU GOT MONEY TO BUY MORE.: NEVER TRUE

## 2023-08-07 SDOH — ECONOMIC STABILITY: INCOME INSECURITY: HOW HARD IS IT FOR YOU TO PAY FOR THE VERY BASICS LIKE FOOD, HOUSING, MEDICAL CARE, AND HEATING?: NOT HARD AT ALL

## 2023-08-07 SDOH — ECONOMIC STABILITY: FOOD INSECURITY: WITHIN THE PAST 12 MONTHS, THE FOOD YOU BOUGHT JUST DIDN'T LAST AND YOU DIDN'T HAVE MONEY TO GET MORE.: NEVER TRUE

## 2023-08-07 ASSESSMENT — PATIENT HEALTH QUESTIONNAIRE - PHQ9
SUM OF ALL RESPONSES TO PHQ QUESTIONS 1-9: 0
1. LITTLE INTEREST OR PLEASURE IN DOING THINGS: 0
SUM OF ALL RESPONSES TO PHQ9 QUESTIONS 1 & 2: 0
SUM OF ALL RESPONSES TO PHQ QUESTIONS 1-9: 0
2. FEELING DOWN, DEPRESSED OR HOPELESS: 0

## 2023-08-07 ASSESSMENT — ENCOUNTER SYMPTOMS
RESPIRATORY NEGATIVE: 1
ALLERGIC/IMMUNOLOGIC NEGATIVE: 1
GASTROINTESTINAL NEGATIVE: 1
BACK PAIN: 1
EYES NEGATIVE: 1

## 2023-08-07 NOTE — PROGRESS NOTES
23  Name: Ganesh Haque    : 1975    Sex: female    Age: 50 y.o. Subjective:  Chief Complaint: Patient is here for bilat lwo  back apin a ndwellenss     Contatnt   dull ache bilat lwo back pain    no abd pain  bm and urine    Energy good      Review of Systems   Constitutional: Negative. HENT: Negative. Eyes: Negative. Respiratory: Negative. Cardiovascular: Negative. Gastrointestinal: Negative. Endocrine: Negative. Genitourinary: Negative. Musculoskeletal:  Positive for back pain. Skin: Negative. Allergic/Immunologic: Negative. Neurological: Negative. Hematological: Negative. Psychiatric/Behavioral: Negative.          Current Outpatient Medications:     etodolac (LODINE) 400 MG tablet, Take 1 tablet by mouth 2 times daily food, Disp: 28 tablet, Rfl: 0    albuterol sulfate HFA (PROVENTIL;VENTOLIN;PROAIR) 108 (90 Base) MCG/ACT inhaler, Inhale 2 puffs into the lungs every 6 hours as needed for Wheezing, Disp: 1 each, Rfl: 5    fluticasone-salmeterol (ADVAIR HFA) 115-21 MCG/ACT inhaler, Inhale 2 puffs into the lungs 2 times daily, Disp: 1 each, Rfl: 12    levocetirizine (XYZAL) 5 MG tablet, Take 5 mg by mouth nightly, Disp: , Rfl:     azelastine (ASTELIN) 0.1 % nasal spray, 1-2 sprays by Nasal route 2 times daily as needed for Rhinitis Use in each nostril as directed, Disp: 90 mL, Rfl: 3    fluticasone (FLONASE) 50 MCG/ACT nasal spray, 2 sprays by Nasal route daily 2 sprays each nostril once daily, Disp: 48 g, Rfl: 3    ADVAIR -21 MCG/ACT inhaler, Inhale 2 puffs into the lungs 2 times daily Rinse mouth out after each use, Disp: 1 each, Rfl: 3    albuterol (PROVENTIL) (2.5 MG/3ML) 0.083% nebulizer solution, Take 3 mLs by nebulization every 6 hours as needed for Wheezing, Disp: 120 each, Rfl: 3    Ciclopirox 1 % SHAM, WASH EVERY OTHER DAY AND LET SIT FOR 5 MINUTES PRIOR TO RINSING, Disp: , Rfl:     MULTIPLE VITAMIN PO, Take by mouth, Disp: , Rfl:

## 2023-08-08 VITALS
HEIGHT: 64 IN | BODY MASS INDEX: 37.05 KG/M2 | TEMPERATURE: 97.8 F | WEIGHT: 217 LBS | DIASTOLIC BLOOD PRESSURE: 78 MMHG | SYSTOLIC BLOOD PRESSURE: 132 MMHG

## 2023-08-10 ENCOUNTER — TELEPHONE (OUTPATIENT)
Dept: PRIMARY CARE CLINIC | Age: 48
End: 2023-08-10

## 2023-08-16 ENCOUNTER — HOSPITAL ENCOUNTER (OUTPATIENT)
Age: 48
Discharge: HOME | End: 2023-08-16
Payer: COMMERCIAL

## 2023-08-16 DIAGNOSIS — E55.9 VITAMIN D DEFICIENCY: ICD-10-CM

## 2023-08-16 DIAGNOSIS — E03.9 ACQUIRED HYPOTHYROIDISM: ICD-10-CM

## 2023-08-16 DIAGNOSIS — I10 ESSENTIAL HYPERTENSION: Chronic | ICD-10-CM

## 2023-08-16 DIAGNOSIS — Z00.01 ENCOUNTER FOR ANNUAL GENERAL MEDICAL EXAMINATION WITH ABNORMAL FINDINGS IN ADULT: ICD-10-CM

## 2023-08-16 LAB
25(OH)D3 SERPL-MCNC: 31.8 NG/ML (ref 30–100)
ALBUMIN SERPL-MCNC: 4.4 G/DL (ref 3.5–5.2)
ALP SERPL-CCNC: 64 U/L (ref 35–104)
ALT SERPL-CCNC: 10 U/L (ref 0–32)
ANION GAP SERPL CALCULATED.3IONS-SCNC: 8 MMOL/L (ref 7–16)
AST SERPL-CCNC: 30 U/L (ref 0–31)
BACTERIA URNS QL MICRO: ABNORMAL
BASOPHILS # BLD: 0.05 K/UL (ref 0–0.2)
BASOPHILS NFR BLD: 1 % (ref 0–2)
BILIRUB SERPL-MCNC: 0.8 MG/DL (ref 0–1.2)
BILIRUB UR QL STRIP: NEGATIVE
BUN SERPL-MCNC: 10 MG/DL (ref 6–20)
CALCIUM SERPL-MCNC: 9.3 MG/DL (ref 8.6–10.2)
CHLORIDE SERPL-SCNC: 105 MMOL/L (ref 98–107)
CHOLEST SERPL-MCNC: 188 MG/DL
CLARITY UR: ABNORMAL
CO2 SERPL-SCNC: 25 MMOL/L (ref 22–29)
COLOR UR: YELLOW
CREAT SERPL-MCNC: 0.8 MG/DL (ref 0.5–1)
EOSINOPHIL # BLD: 0.2 K/UL (ref 0.05–0.5)
EOSINOPHILS RELATIVE PERCENT: 3 % (ref 0–6)
EPI CELLS #/AREA URNS HPF: ABNORMAL /HPF
ERYTHROCYTE [DISTWIDTH] IN BLOOD BY AUTOMATED COUNT: 11.6 % (ref 11.5–15)
GFR SERPL CREATININE-BSD FRML MDRD: >60 ML/MIN/1.73M2
GLUCOSE SERPL-MCNC: 85 MG/DL (ref 74–99)
GLUCOSE UR STRIP-MCNC: NEGATIVE MG/DL
HCT VFR BLD AUTO: 41.6 % (ref 34–48)
HDLC SERPL-MCNC: 72 MG/DL
HGB BLD-MCNC: 14.4 G/DL (ref 11.5–15.5)
HGB UR QL STRIP.AUTO: NEGATIVE
IMM GRANULOCYTES # BLD AUTO: <0.03 K/UL (ref 0–0.58)
IMM GRANULOCYTES NFR BLD: 0 % (ref 0–5)
KETONES UR STRIP-MCNC: NEGATIVE MG/DL
LDLC SERPL CALC-MCNC: 103 MG/DL
LEUKOCYTE ESTERASE UR QL STRIP: NEGATIVE
LYMPHOCYTES NFR BLD: 1.93 K/UL (ref 1.5–4)
LYMPHOCYTES RELATIVE PERCENT: 33 % (ref 20–42)
MCH RBC QN AUTO: 30.9 PG (ref 26–35)
MCHC RBC AUTO-ENTMCNC: 34.6 G/DL (ref 32–34.5)
MCV RBC AUTO: 89.3 FL (ref 80–99.9)
MONOCYTES NFR BLD: 0.38 K/UL (ref 0.1–0.95)
MONOCYTES NFR BLD: 7 % (ref 2–12)
NEUTROPHILS NFR BLD: 56 % (ref 43–80)
NEUTS SEG NFR BLD: 3.26 K/UL (ref 1.8–7.3)
NITRITE UR QL STRIP: NEGATIVE
PH UR STRIP: 6.5 [PH] (ref 5–9)
PLATELET # BLD AUTO: 236 K/UL (ref 130–450)
PMV BLD AUTO: 9.8 FL (ref 7–12)
POTASSIUM SERPL-SCNC: 4.1 MMOL/L (ref 3.5–5)
PROT SERPL-MCNC: 7.6 G/DL (ref 6.4–8.3)
PROT UR STRIP-MCNC: NEGATIVE MG/DL
RBC # BLD AUTO: 4.66 M/UL (ref 3.5–5.5)
RBC #/AREA URNS HPF: ABNORMAL /HPF
SODIUM SERPL-SCNC: 138 MMOL/L (ref 132–146)
SP GR UR STRIP: 1.01 (ref 1–1.03)
T4 SERPL-MCNC: 7.5 UG/DL (ref 4.5–11.7)
TRIGL SERPL-MCNC: 67 MG/DL
TSH SERPL DL<=0.05 MIU/L-ACNC: 0.57 UIU/ML (ref 0.27–4.2)
UROBILINOGEN UR STRIP-ACNC: 1 EU/DL (ref 0–1)
VLDLC SERPL CALC-MCNC: 13 MG/DL
WBC #/AREA URNS HPF: ABNORMAL /HPF
WBC OTHER # BLD: 5.8 K/UL (ref 4.5–11.5)

## 2023-08-16 PROCEDURE — 84443 ASSAY THYROID STIM HORMONE: CPT

## 2023-08-16 PROCEDURE — 36415 COLL VENOUS BLD VENIPUNCTURE: CPT

## 2023-08-16 PROCEDURE — 82306 VITAMIN D 25 HYDROXY: CPT

## 2023-08-16 PROCEDURE — 80061 LIPID PANEL: CPT

## 2023-08-16 PROCEDURE — 81001 URINALYSIS AUTO W/SCOPE: CPT

## 2023-08-16 PROCEDURE — 85025 COMPLETE CBC W/AUTO DIFF WBC: CPT

## 2023-08-16 PROCEDURE — 80053 COMPREHEN METABOLIC PANEL: CPT

## 2023-08-16 PROCEDURE — 84436 ASSAY OF TOTAL THYROXINE: CPT

## 2023-08-21 ENCOUNTER — OFFICE VISIT (OUTPATIENT)
Dept: PRIMARY CARE CLINIC | Age: 48
End: 2023-08-21
Payer: COMMERCIAL

## 2023-08-21 ENCOUNTER — TELEPHONE (OUTPATIENT)
Dept: PRIMARY CARE CLINIC | Age: 48
End: 2023-08-21

## 2023-08-21 VITALS
HEIGHT: 64 IN | HEART RATE: 75 BPM | SYSTOLIC BLOOD PRESSURE: 130 MMHG | OXYGEN SATURATION: 96 % | DIASTOLIC BLOOD PRESSURE: 82 MMHG | TEMPERATURE: 98 F | BODY MASS INDEX: 36.54 KG/M2 | WEIGHT: 214 LBS

## 2023-08-21 DIAGNOSIS — M54.16 LUMBAR RADICULOPATHY: ICD-10-CM

## 2023-08-21 DIAGNOSIS — G89.4 CHRONIC PAIN SYNDROME: ICD-10-CM

## 2023-08-21 DIAGNOSIS — J45.20 MILD INTERMITTENT ASTHMA WITHOUT COMPLICATION: ICD-10-CM

## 2023-08-21 DIAGNOSIS — M47.816 SPONDYLOSIS OF LUMBAR REGION WITHOUT MYELOPATHY OR RADICULOPATHY: Primary | ICD-10-CM

## 2023-08-21 PROCEDURE — G8417 CALC BMI ABV UP PARAM F/U: HCPCS | Performed by: FAMILY MEDICINE

## 2023-08-21 PROCEDURE — 3075F SYST BP GE 130 - 139MM HG: CPT | Performed by: FAMILY MEDICINE

## 2023-08-21 PROCEDURE — G8428 CUR MEDS NOT DOCUMENT: HCPCS | Performed by: FAMILY MEDICINE

## 2023-08-21 PROCEDURE — 3079F DIAST BP 80-89 MM HG: CPT | Performed by: FAMILY MEDICINE

## 2023-08-21 PROCEDURE — 1036F TOBACCO NON-USER: CPT | Performed by: FAMILY MEDICINE

## 2023-08-21 PROCEDURE — 99214 OFFICE O/P EST MOD 30 MIN: CPT | Performed by: FAMILY MEDICINE

## 2023-08-21 ASSESSMENT — ENCOUNTER SYMPTOMS
EYES NEGATIVE: 1
RESPIRATORY NEGATIVE: 1
GASTROINTESTINAL NEGATIVE: 1
BACK PAIN: 1
ALLERGIC/IMMUNOLOGIC NEGATIVE: 1

## 2023-08-21 NOTE — PROGRESS NOTES
complication    Lumbar radiculopathy        Comments:  us   x barrington nreivwd  lab ok  [facility] [place] [facility] txer lsoe wtec      I educated the patient about all medications. Make sure they were correct and educated  on the risk associated with missing meds or taking them incorrectly. A list of medications is being sent home with patient today. Check blood pressure at home twice a day. Low-salt low caffeine diet. Call if systolic blood pressure is above 735 and diastolic blood pressures above 85. Only use a upper arm digital cuff. A great deal of time spent reviewing medications, diet, exercise, social issues. Also reviewing the chart before entering the room with patient and finishing charting after leaving patient's room. More than half of that time was spent face to face with the patient in counseling and coordinating care. Aggressive low-fat diet. Avoid red meats, greasy fried foods, dairy products. Avoid processed foods. Take cholesterol medications without food. I informed patient about the risk associated with noncompliance of medication and taking medications incorrectly. Appropriate follow-up with myself and all specialist.  Encourage family members to take active role in assisting with medications and medical care. If any confusion should develop to notify my office immediately to avoid risk of worsening medical condition      Follow Up: No follow-ups on file.      Seen by:  Stacey Angeles DO

## 2024-05-13 ENCOUNTER — OFFICE VISIT (OUTPATIENT)
Dept: FAMILY MEDICINE CLINIC | Age: 49
End: 2024-05-13

## 2024-05-13 VITALS
RESPIRATION RATE: 17 BRPM | HEIGHT: 64 IN | HEART RATE: 80 BPM | WEIGHT: 223 LBS | SYSTOLIC BLOOD PRESSURE: 133 MMHG | TEMPERATURE: 97.4 F | DIASTOLIC BLOOD PRESSURE: 85 MMHG | BODY MASS INDEX: 38.07 KG/M2

## 2024-05-13 DIAGNOSIS — E66.09 CLASS 2 OBESITY DUE TO EXCESS CALORIES WITHOUT SERIOUS COMORBIDITY WITH BODY MASS INDEX (BMI) OF 38.0 TO 38.9 IN ADULT: Primary | ICD-10-CM

## 2024-05-13 DIAGNOSIS — J45.20 MILD INTERMITTENT ASTHMA WITHOUT COMPLICATION: ICD-10-CM

## 2024-05-13 PROCEDURE — 99213 OFFICE O/P EST LOW 20 MIN: CPT

## 2024-05-13 PROCEDURE — 3079F DIAST BP 80-89 MM HG: CPT

## 2024-05-13 PROCEDURE — 3075F SYST BP GE 130 - 139MM HG: CPT

## 2024-05-13 SDOH — HEALTH STABILITY: PHYSICAL HEALTH: ON AVERAGE, HOW MANY MINUTES DO YOU ENGAGE IN EXERCISE AT THIS LEVEL?: 40 MIN

## 2024-05-13 SDOH — HEALTH STABILITY: PHYSICAL HEALTH: ON AVERAGE, HOW MANY DAYS PER WEEK DO YOU ENGAGE IN MODERATE TO STRENUOUS EXERCISE (LIKE A BRISK WALK)?: 1 DAY

## 2024-05-13 ASSESSMENT — PATIENT HEALTH QUESTIONNAIRE - PHQ9
SUM OF ALL RESPONSES TO PHQ QUESTIONS 1-9: 0
SUM OF ALL RESPONSES TO PHQ QUESTIONS 1-9: 0
SUM OF ALL RESPONSES TO PHQ9 QUESTIONS 1 & 2: 0
SUM OF ALL RESPONSES TO PHQ QUESTIONS 1-9: 0
SUM OF ALL RESPONSES TO PHQ QUESTIONS 1-9: 0
2. FEELING DOWN, DEPRESSED OR HOPELESS: NOT AT ALL
1. LITTLE INTEREST OR PLEASURE IN DOING THINGS: NOT AT ALL

## 2024-05-13 NOTE — PROGRESS NOTES
S: 48 y.o. female with   Chief Complaint   Patient presents with    New Patient     Would like to discuss weight loss options  Has tried constant diets and exercise without results       Mod persistent asthma, advair 115-21 2 puffs BID, controlled, not needed albuterol  HTN, not on meds  Obesity: Tried WW and counting calories (helpful previously), minimal fast food, phentermine in past, +chips and sugar, +metformin and wellbutrin didn't help, 7000 steps/day at work, exercises maybe once weekly, does not want to go to bariatrics    O: VS:  height is 1.626 m (5' 4\") and weight is 101.2 kg (223 lb). Her temperature is 97.4 °F (36.3 °C). Her blood pressure is 133/85 and her pulse is 80. Her respiration is 17.   BP Readings from Last 3 Encounters:   05/13/24 133/85   08/21/23 130/82   08/07/23 132/78     See resident note      Impression/Plan:   1) Obesity: Start with regular exercise with goal 150 minutes/week, limit processed sugar, increase water and overall healthy foods, reevaluate in 4-6 weeks for possible addition of medication  2) Mod intermittent asthma: Controlled, continue same        Health Maintenance Due   Topic Date Due    Hepatitis B vaccine (1 of 3 - 3-dose series) Never done    Pneumococcal 0-64 years Vaccine (1 of 2 - PCV) Never done    HIV screen  Never done    Hepatitis C screen  Never done    DTaP/Tdap/Td vaccine (1 - Tdap) Never done    Colorectal Cancer Screen  Never done    Cervical cancer screen  12/13/2022    COVID-19 Vaccine (3 - 2023-24 season) 09/01/2023         Attending Physician Statement  I have discussed the case, including pertinent history and exam findings with the resident.  I agree with the documented assessment and plan.      Wendy Patino MD  
This patient was screened with SUBS screening tool.   On 5/13/2024 the patient has a Positive Screen and the result can be found scanned into the chart  
no known allergies.  Social History:   Social History     Socioeconomic History    Marital status: Single     Spouse name: Not on file    Number of children: Not on file    Years of education: Not on file    Highest education level: Not on file   Occupational History    Not on file   Tobacco Use    Smoking status: Former     Current packs/day: 0.00     Average packs/day: 0.3 packs/day for 7.0 years (2.1 ttl pk-yrs)     Types: Cigarettes     Start date:      Quit date:      Years since quittin.3    Smokeless tobacco: Never   Substance and Sexual Activity    Alcohol use: Yes     Comment: social    Drug use: No    Sexual activity: Not Currently     Partners: Male     Comment: tubal ligation 2008   Other Topics Concern    Not on file   Social History Narrative    New patient 20 with complaints of mucus in her throat    Single with 3 children.  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  Smoker quitting age 25    Seasonal allergies    Asthma    GERD    Elevated blood pressure      Referred to  ent  for throat irritation    Low back pain      referred to [facility]    Insomnia       Rlq abd pain       sent to er--ct with uterine fobroid    Us kidney    neg for stone     Social Determinants of Health     Financial Resource Strain: Low Risk  (2023)    Overall Financial Resource Strain (CARDIA)     Difficulty of Paying Living Expenses: Not hard at all   Food Insecurity: Not on file (2023)   Transportation Needs: Unknown (2023)    PRAPARE - Transportation     Lack of Transportation (Medical): Not on file     Lack of Transportation (Non-Medical): No   Physical Activity: Insufficiently Active (2024)    Exercise Vital Sign     Days of Exercise per Week: 1 day     Minutes of Exercise per Session: 40 min   Stress: Not on file   Social Connections: Not on file

## 2024-05-13 NOTE — PATIENT INSTRUCTIONS
Smart goals set for weight loss, no more than 1-2 Ibs weight loss a week.   Lifestyle modification discussed  Patient encouraged to increase physical activity as able,   Focus on what you eat, when you eat and how you eat.   Eating healthy : Avoid processed meals ,Avoid eating between meals ,Avoiding sugar drinks ,Have more fiber, increase fruits and vegetables, decrease sweets and carbs.  Patient encouraged to make daily lifestyle adjustments including taking the stairs instead of the elevator, not shopping when hungry to buy healthy snacks, having smaller portions, eat sweets in moderation.   Consider using APPS such as : [employer] ,My net diary , My fitness pals , loose it apps, or listening to pod cast.

## 2024-05-14 PROBLEM — E66.09 CLASS 2 OBESITY DUE TO EXCESS CALORIES WITHOUT SERIOUS COMORBIDITY WITH BODY MASS INDEX (BMI) OF 38.0 TO 38.9 IN ADULT: Status: ACTIVE | Noted: 2024-05-14

## 2024-05-14 PROBLEM — E66.812 CLASS 2 OBESITY DUE TO EXCESS CALORIES WITHOUT SERIOUS COMORBIDITY WITH BODY MASS INDEX (BMI) OF 38.0 TO 38.9 IN ADULT: Status: ACTIVE | Noted: 2024-05-14

## 2024-06-21 ENCOUNTER — OFFICE VISIT (OUTPATIENT)
Dept: FAMILY MEDICINE CLINIC | Age: 49
End: 2024-06-21

## 2024-06-21 VITALS
SYSTOLIC BLOOD PRESSURE: 107 MMHG | OXYGEN SATURATION: 97 % | TEMPERATURE: 97.7 F | HEIGHT: 64 IN | RESPIRATION RATE: 16 BRPM | HEART RATE: 97 BPM | BODY MASS INDEX: 36.54 KG/M2 | WEIGHT: 214 LBS | DIASTOLIC BLOOD PRESSURE: 77 MMHG

## 2024-06-21 DIAGNOSIS — L21.0 DANDRUFF: ICD-10-CM

## 2024-06-21 DIAGNOSIS — E66.09 CLASS 2 OBESITY DUE TO EXCESS CALORIES WITHOUT SERIOUS COMORBIDITY WITH BODY MASS INDEX (BMI) OF 38.0 TO 38.9 IN ADULT: Primary | ICD-10-CM

## 2024-06-21 RX ORDER — BUPROPION HYDROCHLORIDE 150 MG/1
150 TABLET ORAL EVERY MORNING
Qty: 90 TABLET | Refills: 0 | Status: SHIPPED | OUTPATIENT
Start: 2024-06-21

## 2024-06-21 RX ORDER — CICLOPIROX 1 G/100ML
SHAMPOO TOPICAL
Qty: 120 ML | Refills: 5 | Status: SHIPPED | OUTPATIENT
Start: 2024-06-21

## 2024-06-21 ASSESSMENT — ENCOUNTER SYMPTOMS
NAUSEA: 0
SHORTNESS OF BREATH: 0
ABDOMINAL PAIN: 0
CHEST TIGHTNESS: 0
VOMITING: 0
DIARRHEA: 0
COUGH: 0

## 2024-06-21 NOTE — PROGRESS NOTES
[facility]  Precepting Note    Subjective:  Obesity management  Taking Wellbutrin  Mood better helping not snack and eat due to stress  Wants to start exercising    ROS otherwise negative     Past medical, surgical, family and social history were reviewed, non-contributory, and unchanged unless otherwise stated.    Objective:    /77   Pulse 97   Temp 97.7 °F (36.5 °C) (Temporal)   Resp 16   Ht 1.626 m (5' 4.02\")   Wt 97.1 kg (214 lb)   SpO2 97%   BMI 36.71 kg/m²     Exam is as noted by resident with the following changes, additions or corrections:        Assessment/Plan:  Obesity - continue Wellbutrin and lifestyle modifications  Dandruff - refill Ciprolox     Attending Physician Statement  I have reviewed the chart, including any radiology or labs. I have discussed the case, including pertinent history and exam findings with the resident.  I agree with the assessment, plan and orders as documented by the resident.  Please refer to the resident note for additional information.      Electronically signed by STEPHEN PERSAUD MD on 6/21/2024 at 9:20 AM  
----- Message from Jazmin Ann PA-C sent at 5/6/2020 11:08 AM CDT -----  I have notified patient of results of slightly increased hemoglobin A1c via [facility]. Please add repeat hemoglobin A1c and fasting glucose in 6 months to chronic calendar.   Can
comfort and understanding of instructions.    I encourage further reading and education about your health conditions.Information on many health conditions is provided by the [employer] Academy [employer]: https://familydoctor.org/  Please bring any questions to me at your next visit.    Medication List:    Current Outpatient Medications   Medication Sig Dispense Refill    Ciclopirox 1 % SHAM WASH EVERY OTHER DAY AND LET SIT FOR 5 MINUTES PRIOR TO RINSING 120 mL 5    buPROPion (WELLBUTRIN XL) 150 MG extended release tablet Take 1 tablet by mouth every morning 90 tablet 0    albuterol sulfate HFA (PROVENTIL;VENTOLIN;PROAIR) 108 (90 Base) MCG/ACT inhaler Inhale 2 puffs into the lungs every 6 hours as needed for Wheezing 1 each 5    fluticasone-salmeterol (ADVAIR HFA) 115-21 MCG/ACT inhaler Inhale 2 puffs into the lungs 2 times daily 1 each 12    levocetirizine (XYZAL) 5 MG tablet Take 1 tablet by mouth nightly      ADVAIR -21 MCG/ACT inhaler Inhale 2 puffs into the lungs 2 times daily Rinse mouth out after each use 1 each 3    albuterol (PROVENTIL) (2.5 MG/3ML) 0.083% nebulizer solution Take 3 mLs by nebulization every 6 hours as needed for Wheezing 120 each 3     No current facility-administered medications for this visit.      Return to Office: Return in about 6 months (around 12/21/2024) for weight.      This document may have been prepared at least partially through the use of voice recognition software. Although effort is taken to assure the accuracy of this document, it is possible that grammatical, syntax,  or spelling errors may occur.    Kait Biggs MD PGY-3

## 2024-09-16 DIAGNOSIS — E66.09 CLASS 2 OBESITY DUE TO EXCESS CALORIES WITHOUT SERIOUS COMORBIDITY WITH BODY MASS INDEX (BMI) OF 38.0 TO 38.9 IN ADULT: ICD-10-CM

## 2024-09-16 RX ORDER — BUPROPION HYDROCHLORIDE 150 MG/1
150 TABLET ORAL EVERY MORNING
Qty: 90 TABLET | Refills: 0 | Status: SHIPPED | OUTPATIENT
Start: 2024-09-16

## 2024-09-19 ENCOUNTER — OFFICE VISIT (OUTPATIENT)
Dept: FAMILY MEDICINE CLINIC | Age: 49
End: 2024-09-19
Payer: COMMERCIAL

## 2024-09-19 VITALS
TEMPERATURE: 97.9 F | DIASTOLIC BLOOD PRESSURE: 86 MMHG | SYSTOLIC BLOOD PRESSURE: 120 MMHG | BODY MASS INDEX: 37.08 KG/M2 | HEART RATE: 78 BPM | HEIGHT: 64 IN | RESPIRATION RATE: 18 BRPM | WEIGHT: 217.2 LBS

## 2024-09-19 DIAGNOSIS — E66.09 CLASS 2 OBESITY DUE TO EXCESS CALORIES WITHOUT SERIOUS COMORBIDITY WITH BODY MASS INDEX (BMI) OF 37.0 TO 37.9 IN ADULT: Primary | ICD-10-CM

## 2024-09-19 PROCEDURE — 99213 OFFICE O/P EST LOW 20 MIN: CPT

## 2024-09-19 PROCEDURE — 3074F SYST BP LT 130 MM HG: CPT

## 2024-09-19 PROCEDURE — 3079F DIAST BP 80-89 MM HG: CPT

## 2024-09-19 RX ORDER — PHENTERMINE HYDROCHLORIDE 15 MG/1
15 CAPSULE ORAL EVERY MORNING
Qty: 30 CAPSULE | Refills: 0 | Status: CANCELLED | OUTPATIENT
Start: 2024-09-19 | End: 2024-10-19

## 2024-09-19 RX ORDER — PHENTERMINE HYDROCHLORIDE 37.5 MG/1
37.5 TABLET ORAL
Qty: 90 TABLET | Refills: 0 | Status: CANCELLED | OUTPATIENT
Start: 2024-09-19 | End: 2024-12-18

## 2024-09-19 RX ORDER — PHENTERMINE HYDROCHLORIDE 37.5 MG/1
37.5 TABLET ORAL
Qty: 30 TABLET | Refills: 0 | Status: SHIPPED | OUTPATIENT
Start: 2024-09-19 | End: 2024-10-19

## 2024-09-19 SDOH — ECONOMIC STABILITY: FOOD INSECURITY: WITHIN THE PAST 12 MONTHS, YOU WORRIED THAT YOUR FOOD WOULD RUN OUT BEFORE YOU GOT MONEY TO BUY MORE.: NEVER TRUE

## 2024-09-19 SDOH — ECONOMIC STABILITY: FOOD INSECURITY: WITHIN THE PAST 12 MONTHS, THE FOOD YOU BOUGHT JUST DIDN'T LAST AND YOU DIDN'T HAVE MONEY TO GET MORE.: NEVER TRUE

## 2024-09-19 SDOH — ECONOMIC STABILITY: INCOME INSECURITY: HOW HARD IS IT FOR YOU TO PAY FOR THE VERY BASICS LIKE FOOD, HOUSING, MEDICAL CARE, AND HEATING?: NOT HARD AT ALL

## 2024-12-18 ENCOUNTER — OFFICE VISIT (OUTPATIENT)
Dept: FAMILY MEDICINE CLINIC | Age: 49
End: 2024-12-18

## 2024-12-18 VITALS
BODY MASS INDEX: 36.9 KG/M2 | RESPIRATION RATE: 17 BRPM | OXYGEN SATURATION: 98 % | DIASTOLIC BLOOD PRESSURE: 81 MMHG | HEART RATE: 79 BPM | WEIGHT: 215 LBS | SYSTOLIC BLOOD PRESSURE: 121 MMHG | TEMPERATURE: 98.2 F

## 2024-12-18 DIAGNOSIS — E66.09 CLASS 2 OBESITY DUE TO EXCESS CALORIES WITHOUT SERIOUS COMORBIDITY WITH BODY MASS INDEX (BMI) OF 37.0 TO 37.9 IN ADULT: ICD-10-CM

## 2024-12-18 DIAGNOSIS — I10 ESSENTIAL HYPERTENSION: Primary | Chronic | ICD-10-CM

## 2024-12-18 DIAGNOSIS — E66.812 CLASS 2 OBESITY DUE TO EXCESS CALORIES WITHOUT SERIOUS COMORBIDITY WITH BODY MASS INDEX (BMI) OF 37.0 TO 37.9 IN ADULT: ICD-10-CM

## 2024-12-18 DIAGNOSIS — Z29.9 PREVENTIVE MEASURE: ICD-10-CM

## 2024-12-18 DIAGNOSIS — J45.20 MILD INTERMITTENT ASTHMA WITHOUT COMPLICATION: ICD-10-CM

## 2024-12-18 DIAGNOSIS — Z12.11 COLON CANCER SCREENING: ICD-10-CM

## 2024-12-18 RX ORDER — BUPROPION HYDROCHLORIDE 150 MG/1
150 TABLET ORAL EVERY MORNING
Qty: 90 TABLET | Refills: 0 | Status: CANCELLED | OUTPATIENT
Start: 2024-12-18

## 2024-12-18 RX ORDER — PHENTERMINE HYDROCHLORIDE 37.5 MG/1
37.5 TABLET ORAL
Qty: 30 TABLET | Refills: 0 | Status: SHIPPED | OUTPATIENT
Start: 2024-12-18 | End: 2025-01-17

## 2024-12-18 ASSESSMENT — ENCOUNTER SYMPTOMS
WHEEZING: 0
EYE PAIN: 0
SINUS PAIN: 0
ABDOMINAL PAIN: 0
BLOOD IN STOOL: 0
SHORTNESS OF BREATH: 0
CONSTIPATION: 0
DIARRHEA: 0
NAUSEA: 0
VOMITING: 0

## 2024-12-18 NOTE — PROGRESS NOTES
[facility]    Subjective:  Follow up, new to provider   HTN, hx of asthma   Previously on Phentermine, wants to restart  Was only on for one month. Has tried diet modification, restricting salt   Ok for Flu and Cologuard     ROS otherwise negative    Past medical, surgical, family and social history were reviewed, non-contributory, and unchanged unless otherwise stated.    Objective:    /81   Pulse 79   Temp 98.2 °F (36.8 °C) (Temporal)   Resp 17   Wt 97.5 kg (215 lb)   SpO2 98%   BMI 36.90 kg/m²     Exam is as noted by resident with the following changes, additions or corrections:    Assessment/Plan:    BMI 36- restart Phentermine. Watch BP/HR  HTN- controlled   Asthma- controlled, cont current regimen   HM- Cologuard and Flu     Attending Physician Statement  I have reviewed the chart, including any radiology or labs, and have seen the patient with the resident(s).  I personally reviewed and performed key elements of the history and exam.  I agree with the assessment, plan and orders as documented by the resident.  Please refer to the resident note for additional information.      Electronically signed by Zaki Farnsworth MD on 12/18/2024 at 3:31 PM    
motion.      Cervical back: Normal range of motion and neck supple.   Skin:     General: Skin is warm.      Findings: No bruising.   Neurological:      Mental Status: She is alert and oriented to person, place, and time. Mental status is at baseline.      Sensory: No sensory deficit.      Motor: No weakness.   Psychiatric:         Mood and Affect: Mood normal.         ASSESSMENT AND PLAN     1. Class 2 obesity due to excess calories without serious comorbidity with body mass index (BMI) of 37.0 to 37.9 in adult  The patient had good response to the medication. She denied any side effects. Will follow up in 4 weeks. She will augment it with exercise and diet  - phentermine (ADIPEX-P) 37.5 MG tablet; Take 1 tablet by mouth every morning (before breakfast) for 30 days. Max Daily Amount: 37.5 mg  Dispense: 30 tablet; Refill: 0    2. Essential hypertension  Controlled. No need to make any changes at this time    3. Mild intermittent asthma without complication  Controlled. Continue current regimen    4. Preventive measure  The patient is interested in having Cologuard for colon cancer screening  - Cologuard (Fecal DNA Colorectal Cancer Screening)      Counseled regarding above diagnosis, including possible risks and complications, especially if left uncontrolled. Counseled regarding the possible side effects, risks, benefits and alternatives to treatment; patient and/or guardian verbalizes understanding, agrees, feels comfortable with and wishes to proceed with above treatment plan.    Call or go to ED immediately if symptoms worsen or persist. Advised patient to call with any new medication issues, and, as applicable, read all Rx info from pharmacy to assure aware of all possible risks and side effects of medication before taking.     Patient and/or guardian given opportunity to ask questions/raise concerns.The patient verbalized comfort and understanding of instructions.    I encourage further reading and education

## 2024-12-19 ENCOUNTER — TELEPHONE (OUTPATIENT)
Dept: FAMILY MEDICINE CLINIC | Age: 49
End: 2024-12-19

## 2024-12-19 DIAGNOSIS — E66.812 CLASS 2 OBESITY DUE TO EXCESS CALORIES WITHOUT SERIOUS COMORBIDITY WITH BODY MASS INDEX (BMI) OF 38.0 TO 38.9 IN ADULT: ICD-10-CM

## 2024-12-19 DIAGNOSIS — E66.09 CLASS 2 OBESITY DUE TO EXCESS CALORIES WITHOUT SERIOUS COMORBIDITY WITH BODY MASS INDEX (BMI) OF 38.0 TO 38.9 IN ADULT: ICD-10-CM

## 2024-12-19 RX ORDER — BUPROPION HYDROCHLORIDE 150 MG/1
150 TABLET ORAL EVERY MORNING
Qty: 90 TABLET | Refills: 0 | OUTPATIENT
Start: 2024-12-19

## 2024-12-19 NOTE — TELEPHONE ENCOUNTER
I will to see the patient in the clinic again for her follow up appointment before refilling the required medication. Thank you

## 2024-12-19 NOTE — TELEPHONE ENCOUNTER
Name of Medication(s) Requested:  Requested Prescriptions     Pending Prescriptions Disp Refills    buPROPion (WELLBUTRIN XL) 150 MG extended release tablet 90 tablet 0     Sig: Take 1 tablet by mouth every morning       Medication is on current medication list Yes    Dosage and directions were verified? Yes    Quantity verified: 90 day supply     Pharmacy Verified?  No    Last Appointment:  12/18/2024    Future appts:  Future Appointments   Date Time Provider Department Center   1/16/2025  9:00 AM Brody Galicia MD [facility]        (If no appt send self scheduling link. .REFILLAPPT)  Scheduling request sent?     [] Yes  [x] No    Does patient need updated?  [] Yes  [x] No

## 2024-12-19 NOTE — TELEPHONE ENCOUNTER
Fax received from [facility] regarding Cologuard order from 12/18. The diagnosis given (Z29.9 Preventative Measure) is likely not covered by insurance. See scan for follow up information

## 2025-01-19 LAB — NONINV COLON CA DNA+OCC BLD SCRN STL QL: NEGATIVE

## 2025-04-05 ENCOUNTER — TELEPHONE (OUTPATIENT)
Dept: PRIMARY CARE CLINIC | Age: 50
End: 2025-04-05

## 2025-07-07 ENCOUNTER — OFFICE VISIT (OUTPATIENT)
Dept: FAMILY MEDICINE CLINIC | Age: 50
End: 2025-07-07
Payer: COMMERCIAL

## 2025-07-07 VITALS
SYSTOLIC BLOOD PRESSURE: 126 MMHG | RESPIRATION RATE: 18 BRPM | TEMPERATURE: 98.8 F | BODY MASS INDEX: 39.09 KG/M2 | DIASTOLIC BLOOD PRESSURE: 82 MMHG | HEART RATE: 73 BPM | WEIGHT: 229 LBS | HEIGHT: 64 IN | OXYGEN SATURATION: 98 %

## 2025-07-07 DIAGNOSIS — E66.09 CLASS 2 OBESITY DUE TO EXCESS CALORIES WITHOUT SERIOUS COMORBIDITY WITH BODY MASS INDEX (BMI) OF 38.0 TO 38.9 IN ADULT: ICD-10-CM

## 2025-07-07 DIAGNOSIS — J45.40 MODERATE PERSISTENT ASTHMA WITHOUT COMPLICATION: ICD-10-CM

## 2025-07-07 DIAGNOSIS — E78.00 ELEVATED LOW DENSITY LIPOPROTEIN (LDL) CHOLESTEROL LEVEL: ICD-10-CM

## 2025-07-07 DIAGNOSIS — E66.812 CLASS 2 OBESITY DUE TO EXCESS CALORIES WITHOUT SERIOUS COMORBIDITY WITH BODY MASS INDEX (BMI) OF 38.0 TO 38.9 IN ADULT: ICD-10-CM

## 2025-07-07 DIAGNOSIS — Z00.00 PHYSICAL EXAM, ANNUAL: Primary | ICD-10-CM

## 2025-07-07 PROCEDURE — 99396 PREV VISIT EST AGE 40-64: CPT

## 2025-07-07 PROCEDURE — 3079F DIAST BP 80-89 MM HG: CPT

## 2025-07-07 PROCEDURE — 3074F SYST BP LT 130 MM HG: CPT

## 2025-07-07 RX ORDER — LORATADINE 10 MG/1
10 TABLET ORAL DAILY
COMMUNITY

## 2025-07-07 RX ORDER — FLUTICASONE PROPIONATE AND SALMETEROL XINAFOATE 230; 21 UG/1; UG/1
2 AEROSOL, METERED RESPIRATORY (INHALATION) 2 TIMES DAILY
Qty: 1 EACH | Refills: 3 | Status: SHIPPED | OUTPATIENT
Start: 2025-07-07

## 2025-07-07 ASSESSMENT — PATIENT HEALTH QUESTIONNAIRE - PHQ9
SUM OF ALL RESPONSES TO PHQ QUESTIONS 1-9: 0
2. FEELING DOWN, DEPRESSED OR HOPELESS: NOT AT ALL
1. LITTLE INTEREST OR PLEASURE IN DOING THINGS: NOT AT ALL
SUM OF ALL RESPONSES TO PHQ QUESTIONS 1-9: 0

## 2025-07-09 ENCOUNTER — HOSPITAL ENCOUNTER (OUTPATIENT)
Age: 50
Discharge: HOME | End: 2025-07-09
Payer: COMMERCIAL

## 2025-07-09 DIAGNOSIS — Z00.00 PHYSICAL EXAM, ANNUAL: ICD-10-CM

## 2025-07-09 DIAGNOSIS — E66.09 CLASS 2 OBESITY DUE TO EXCESS CALORIES WITHOUT SERIOUS COMORBIDITY WITH BODY MASS INDEX (BMI) OF 38.0 TO 38.9 IN ADULT: ICD-10-CM

## 2025-07-09 DIAGNOSIS — E78.00 ELEVATED LOW DENSITY LIPOPROTEIN (LDL) CHOLESTEROL LEVEL: ICD-10-CM

## 2025-07-09 DIAGNOSIS — E66.812 CLASS 2 OBESITY DUE TO EXCESS CALORIES WITHOUT SERIOUS COMORBIDITY WITH BODY MASS INDEX (BMI) OF 38.0 TO 38.9 IN ADULT: ICD-10-CM

## 2025-07-09 LAB
ALBUMIN SERPL-MCNC: 4.2 G/DL (ref 3.5–5.2)
ALP SERPL-CCNC: 87 U/L (ref 35–104)
ALT SERPL-CCNC: 18 U/L (ref 0–35)
ANION GAP SERPL CALCULATED.3IONS-SCNC: 8 MMOL/L (ref 7–16)
AST SERPL-CCNC: 39 U/L (ref 0–35)
BASOPHILS # BLD: 0.04 K/UL (ref 0–0.2)
BASOPHILS NFR BLD: 1 % (ref 0–2)
BILIRUB SERPL-MCNC: 0.4 MG/DL (ref 0–1.2)
BUN SERPL-MCNC: 16 MG/DL (ref 6–20)
CALCIUM SERPL-MCNC: 9.2 MG/DL (ref 8.6–10)
CHLORIDE SERPL-SCNC: 105 MMOL/L (ref 98–107)
CHOLEST SERPL-MCNC: 234 MG/DL
CO2 SERPL-SCNC: 25 MMOL/L (ref 22–29)
CREAT SERPL-MCNC: 0.7 MG/DL (ref 0.5–1)
EOSINOPHIL # BLD: 0.23 K/UL (ref 0.05–0.5)
EOSINOPHILS RELATIVE PERCENT: 3 % (ref 0–6)
ERYTHROCYTE [DISTWIDTH] IN BLOOD BY AUTOMATED COUNT: 12.2 % (ref 11.5–15)
GFR, ESTIMATED: >90 ML/MIN/1.73M2
GLUCOSE SERPL-MCNC: 95 MG/DL (ref 74–99)
HCT VFR BLD AUTO: 40.9 % (ref 34–48)
HDLC SERPL-MCNC: 71 MG/DL
HGB BLD-MCNC: 14 G/DL (ref 11.5–15.5)
IMM GRANULOCYTES # BLD AUTO: <0.03 K/UL (ref 0–0.58)
IMM GRANULOCYTES NFR BLD: 0 % (ref 0–5)
LDLC SERPL CALC-MCNC: 135 MG/DL
LYMPHOCYTES NFR BLD: 2.19 K/UL (ref 1.5–4)
LYMPHOCYTES RELATIVE PERCENT: 29 % (ref 20–42)
MCH RBC QN AUTO: 30.4 PG (ref 26–35)
MCHC RBC AUTO-ENTMCNC: 34.2 G/DL (ref 32–34.5)
MCV RBC AUTO: 88.9 FL (ref 80–99.9)
MONOCYTES NFR BLD: 0.57 K/UL (ref 0.1–0.95)
MONOCYTES NFR BLD: 8 % (ref 2–12)
NEUTROPHILS NFR BLD: 60 % (ref 43–80)
NEUTS SEG NFR BLD: 4.55 K/UL (ref 1.8–7.3)
PLATELET # BLD AUTO: 270 K/UL (ref 130–450)
PMV BLD AUTO: 10.1 FL (ref 7–12)
POTASSIUM SERPL-SCNC: 4.3 MMOL/L (ref 3.5–5.1)
PROT SERPL-MCNC: 7.4 G/DL (ref 6.4–8.3)
RBC # BLD AUTO: 4.6 M/UL (ref 3.5–5.5)
SODIUM SERPL-SCNC: 137 MMOL/L (ref 136–145)
TRIGL SERPL-MCNC: 136 MG/DL
VLDLC SERPL CALC-MCNC: 27 MG/DL
WBC OTHER # BLD: 7.6 K/UL (ref 4.5–11.5)

## 2025-07-09 PROCEDURE — 80061 LIPID PANEL: CPT

## 2025-07-09 PROCEDURE — 36415 COLL VENOUS BLD VENIPUNCTURE: CPT

## 2025-07-09 PROCEDURE — 85025 COMPLETE CBC W/AUTO DIFF WBC: CPT

## 2025-07-09 PROCEDURE — 80053 COMPREHEN METABOLIC PANEL: CPT
